# Patient Record
Sex: MALE | Race: BLACK OR AFRICAN AMERICAN | NOT HISPANIC OR LATINO | Employment: FULL TIME | URBAN - METROPOLITAN AREA
[De-identification: names, ages, dates, MRNs, and addresses within clinical notes are randomized per-mention and may not be internally consistent; named-entity substitution may affect disease eponyms.]

---

## 2023-03-13 ENCOUNTER — HOSPITAL ENCOUNTER (EMERGENCY)
Facility: HOSPITAL | Age: 23
Discharge: HOME | End: 2023-03-13
Attending: INTERNAL MEDICINE
Payer: MEDICAID

## 2023-03-13 VITALS
SYSTOLIC BLOOD PRESSURE: 140 MMHG | HEIGHT: 72 IN | RESPIRATION RATE: 16 BRPM | DIASTOLIC BLOOD PRESSURE: 84 MMHG | HEART RATE: 54 BPM | BODY MASS INDEX: 22.69 KG/M2 | TEMPERATURE: 98 F | WEIGHT: 167.56 LBS | OXYGEN SATURATION: 100 %

## 2023-03-13 DIAGNOSIS — K02.9 COMPLEX DENTAL CAVITY: Primary | ICD-10-CM

## 2023-03-13 PROCEDURE — 99284 EMERGENCY DEPT VISIT MOD MDM: CPT

## 2023-03-13 PROCEDURE — 63600175 PHARM REV CODE 636 W HCPCS: Performed by: INTERNAL MEDICINE

## 2023-03-13 PROCEDURE — 96372 THER/PROPH/DIAG INJ SC/IM: CPT | Performed by: INTERNAL MEDICINE

## 2023-03-13 RX ORDER — KETOROLAC TROMETHAMINE 30 MG/ML
30 INJECTION, SOLUTION INTRAMUSCULAR; INTRAVENOUS
Status: COMPLETED | OUTPATIENT
Start: 2023-03-13 | End: 2023-03-13

## 2023-03-13 RX ORDER — PENICILLIN V POTASSIUM 500 MG/1
500 TABLET, FILM COATED ORAL EVERY 8 HOURS
Qty: 21 TABLET | Refills: 0 | Status: SHIPPED | OUTPATIENT
Start: 2023-03-13 | End: 2023-03-20

## 2023-03-13 RX ORDER — IBUPROFEN 800 MG/1
800 TABLET ORAL 3 TIMES DAILY PRN
Qty: 20 TABLET | Refills: 0 | Status: SHIPPED | OUTPATIENT
Start: 2023-03-13 | End: 2023-06-06 | Stop reason: SDUPTHER

## 2023-03-13 RX ORDER — CHLORHEXIDINE GLUCONATE ORAL RINSE 1.2 MG/ML
15 SOLUTION DENTAL 2 TIMES DAILY
Qty: 473 ML | Refills: 0 | Status: SHIPPED | OUTPATIENT
Start: 2023-03-13 | End: 2023-03-27

## 2023-03-13 RX ADMIN — KETOROLAC TROMETHAMINE 30 MG: 30 INJECTION, SOLUTION INTRAMUSCULAR at 04:03

## 2023-03-13 NOTE — ED PROVIDER NOTES
Encounter Date: 3/13/2023       History     Chief Complaint   Patient presents with    Dental Pain     Upper right dental pain; old chipped tooth with pain exacerbation.     Presents with toothache today, states chipped teeth on upper right area. No swelling or dysphagia, no trismus or drooling    The history is provided by the patient.   Review of patient's allergies indicates:  No Known Allergies  History reviewed. No pertinent past medical history.  History reviewed. No pertinent surgical history.  History reviewed. No pertinent family history.  Social History     Tobacco Use    Smoking status: Never    Smokeless tobacco: Never     Review of Systems   Constitutional:  Negative for fever.   HENT:  Positive for dental problem. Negative for drooling and sore throat.    Respiratory:  Negative for shortness of breath.    Cardiovascular:  Negative for chest pain.   Gastrointestinal:  Negative for nausea.   Genitourinary:  Negative for dysuria.   Musculoskeletal:  Negative for back pain.   Skin:  Negative for rash.   Neurological:  Negative for weakness.   Hematological:  Does not bruise/bleed easily.   All other systems reviewed and are negative.    Physical Exam     Initial Vitals [03/13/23 0339]   BP Pulse Resp Temp SpO2   (!) 140/84 (!) 54 16 98.1 °F (36.7 °C) 100 %      MAP       --         Physical Exam    Nursing note and vitals reviewed.  Constitutional: He appears well-developed and well-nourished. No distress.   HENT:   Head: Normocephalic and atraumatic.   Nose: Nose normal.   Mouth/Throat: Oropharynx is clear and moist. No oropharyngeal exudate.   Complex cavity on #15   Eyes: Pupils are equal, round, and reactive to light.   Neck: Neck supple.   Normal range of motion.  Cardiovascular:  Regular rhythm, normal heart sounds and intact distal pulses.           Pulmonary/Chest: Breath sounds normal. No respiratory distress.   Musculoskeletal:         General: No edema. Normal range of motion.      Cervical  back: Normal range of motion and neck supple.     Neurological: He is alert and oriented to person, place, and time. He has normal strength. GCS score is 15. GCS eye subscore is 4. GCS verbal subscore is 5. GCS motor subscore is 6.   Skin: Skin is warm and dry. No rash noted.   Psychiatric: His behavior is normal.       ED Course   Procedures  Labs Reviewed - No data to display       Imaging Results    None          Medications   ketorolac injection 30 mg (has no administration in time range)                              Clinical Impression:   Final diagnoses:  [K02.9] Complex dental cavity (Primary)        ED Disposition Condition    Discharge Stable          ED Prescriptions       Medication Sig Dispense Start Date End Date Auth. Provider    penicillin v potassium (VEETID) 500 MG tablet Take 1 tablet (500 mg total) by mouth every 8 (eight) hours. for 7 days 21 tablet 3/13/2023 3/20/2023 Julio Cooley MD    chlorhexidine (PERIDEX) 0.12 % solution Use as directed 15 mLs in the mouth or throat 2 (two) times daily. for 14 days 473 mL 3/13/2023 3/27/2023 Julio Cooley MD    ibuprofen (ADVIL,MOTRIN) 800 MG tablet Take 1 tablet (800 mg total) by mouth 3 (three) times daily as needed for Pain. 20 tablet 3/13/2023 -- Julio Cooley MD          Follow-up Information       Follow up With Specialties Details Why Contact Info    [facility] Emergency Medicine  If symptoms worsen [address] [facility] [address] [phone]    Dentist  Schedule an appointment as soon as possible for a visit in 3 days  List Provided             Julio Cooley MD  03/13/23 8264

## 2023-03-19 ENCOUNTER — HOSPITAL ENCOUNTER (EMERGENCY)
Facility: HOSPITAL | Age: 23
Discharge: HOME | End: 2023-03-19
Attending: STUDENT IN AN ORGANIZED HEALTH CARE EDUCATION/TRAINING PROGRAM
Payer: MEDICAID

## 2023-03-19 VITALS
OXYGEN SATURATION: 97 % | TEMPERATURE: 98 F | DIASTOLIC BLOOD PRESSURE: 86 MMHG | WEIGHT: 165.44 LBS | HEART RATE: 71 BPM | SYSTOLIC BLOOD PRESSURE: 134 MMHG | BODY MASS INDEX: 22.41 KG/M2 | RESPIRATION RATE: 19 BRPM | HEIGHT: 72 IN

## 2023-03-19 DIAGNOSIS — J32.0 MAXILLARY SINUSITIS, UNSPECIFIED CHRONICITY: ICD-10-CM

## 2023-03-19 DIAGNOSIS — S00.01XA ABRASION OF SCALP, INITIAL ENCOUNTER: ICD-10-CM

## 2023-03-19 DIAGNOSIS — S09.90XA CLOSED HEAD INJURY, INITIAL ENCOUNTER: Primary | ICD-10-CM

## 2023-03-19 DIAGNOSIS — S06.0XAA CONCUSSION WITH UNKNOWN LOSS OF CONSCIOUSNESS STATUS, INITIAL ENCOUNTER: ICD-10-CM

## 2023-03-19 PROCEDURE — 99284 EMERGENCY DEPT VISIT MOD MDM: CPT | Mod: 25

## 2023-03-19 PROCEDURE — 25000003 PHARM REV CODE 250: Performed by: STUDENT IN AN ORGANIZED HEALTH CARE EDUCATION/TRAINING PROGRAM

## 2023-03-19 RX ORDER — ACETAMINOPHEN 500 MG
1000 TABLET ORAL
Status: COMPLETED | OUTPATIENT
Start: 2023-03-19 | End: 2023-03-19

## 2023-03-19 RX ORDER — BUTALBITAL, ACETAMINOPHEN AND CAFFEINE 50; 325; 40 MG/1; MG/1; MG/1
1 TABLET ORAL EVERY 6 HOURS PRN
Qty: 14 TABLET | Refills: 0 | Status: SHIPPED | OUTPATIENT
Start: 2023-03-19 | End: 2023-06-21 | Stop reason: ALTCHOICE

## 2023-03-19 RX ORDER — AZELASTINE 1 MG/ML
2 SPRAY, METERED NASAL 2 TIMES DAILY
Qty: 30 ML | Refills: 0 | Status: SHIPPED | OUTPATIENT
Start: 2023-03-19 | End: 2023-06-06 | Stop reason: SDUPTHER

## 2023-03-19 RX ORDER — KETOROLAC TROMETHAMINE 10 MG/1
10 TABLET, FILM COATED ORAL
Status: COMPLETED | OUTPATIENT
Start: 2023-03-19 | End: 2023-03-19

## 2023-03-19 RX ADMIN — KETOROLAC TROMETHAMINE 10 MG: 10 TABLET, FILM COATED ORAL at 10:03

## 2023-03-19 RX ADMIN — ACETAMINOPHEN 1000 MG: 500 TABLET ORAL at 10:03

## 2023-03-19 NOTE — Clinical Note
"Tommy Reis" Juarez was seen and treated in our emergency department on 3/19/2023.  He may return to work on 03/22/2023.       If you have any questions or concerns, please don't hesitate to call.      Andrew Douglas MD"
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
## 2023-06-06 ENCOUNTER — OFFICE VISIT (OUTPATIENT)
Dept: INTERNAL MEDICINE | Facility: CLINIC | Age: 23
End: 2023-06-06
Payer: MEDICAID

## 2023-06-06 VITALS
TEMPERATURE: 98 F | BODY MASS INDEX: 23.52 KG/M2 | DIASTOLIC BLOOD PRESSURE: 69 MMHG | RESPIRATION RATE: 20 BRPM | WEIGHT: 173.63 LBS | HEIGHT: 72 IN | HEART RATE: 65 BPM | SYSTOLIC BLOOD PRESSURE: 115 MMHG

## 2023-06-06 DIAGNOSIS — J32.0 RIGHT MAXILLARY SINUSITIS: ICD-10-CM

## 2023-06-06 DIAGNOSIS — J35.8 TONSILLOLITH: Chronic | ICD-10-CM

## 2023-06-06 DIAGNOSIS — Z13.29 SCREENING FOR THYROID DISORDER: ICD-10-CM

## 2023-06-06 DIAGNOSIS — R04.0 EPISTAXIS: ICD-10-CM

## 2023-06-06 DIAGNOSIS — Z76.89 ENCOUNTER TO ESTABLISH CARE: ICD-10-CM

## 2023-06-06 DIAGNOSIS — Z13.220 SCREENING CHOLESTEROL LEVEL: ICD-10-CM

## 2023-06-06 DIAGNOSIS — Z00.00 WELLNESS EXAMINATION: ICD-10-CM

## 2023-06-06 DIAGNOSIS — Z13.1 SCREENING FOR DIABETES MELLITUS: Primary | ICD-10-CM

## 2023-06-06 PROCEDURE — 1159F MED LIST DOCD IN RCRD: CPT | Mod: CPTII,,, | Performed by: NURSE PRACTITIONER

## 2023-06-06 PROCEDURE — 3078F DIAST BP <80 MM HG: CPT | Mod: CPTII,,, | Performed by: NURSE PRACTITIONER

## 2023-06-06 PROCEDURE — 3074F SYST BP LT 130 MM HG: CPT | Mod: CPTII,,, | Performed by: NURSE PRACTITIONER

## 2023-06-06 PROCEDURE — 1160F PR REVIEW ALL MEDS BY PRESCRIBER/CLIN PHARMACIST DOCUMENTED: ICD-10-PCS | Mod: CPTII,,, | Performed by: NURSE PRACTITIONER

## 2023-06-06 PROCEDURE — 1160F RVW MEDS BY RX/DR IN RCRD: CPT | Mod: CPTII,,, | Performed by: NURSE PRACTITIONER

## 2023-06-06 PROCEDURE — 3008F PR BODY MASS INDEX (BMI) DOCUMENTED: ICD-10-PCS | Mod: CPTII,,, | Performed by: NURSE PRACTITIONER

## 2023-06-06 PROCEDURE — 99204 OFFICE O/P NEW MOD 45 MIN: CPT | Mod: S$PBB,,, | Performed by: NURSE PRACTITIONER

## 2023-06-06 PROCEDURE — 3078F PR MOST RECENT DIASTOLIC BLOOD PRESSURE < 80 MM HG: ICD-10-PCS | Mod: CPTII,,, | Performed by: NURSE PRACTITIONER

## 2023-06-06 PROCEDURE — 3074F PR MOST RECENT SYSTOLIC BLOOD PRESSURE < 130 MM HG: ICD-10-PCS | Mod: CPTII,,, | Performed by: NURSE PRACTITIONER

## 2023-06-06 PROCEDURE — 3008F BODY MASS INDEX DOCD: CPT | Mod: CPTII,,, | Performed by: NURSE PRACTITIONER

## 2023-06-06 PROCEDURE — 1159F PR MEDICATION LIST DOCUMENTED IN MEDICAL RECORD: ICD-10-PCS | Mod: CPTII,,, | Performed by: NURSE PRACTITIONER

## 2023-06-06 PROCEDURE — 99204 PR OFFICE/OUTPT VISIT, NEW, LEVL IV, 45-59 MIN: ICD-10-PCS | Mod: S$PBB,,, | Performed by: NURSE PRACTITIONER

## 2023-06-06 PROCEDURE — 99214 OFFICE O/P EST MOD 30 MIN: CPT | Mod: PBBFAC | Performed by: NURSE PRACTITIONER

## 2023-06-06 RX ORDER — AMOXICILLIN 500 MG/1
500 CAPSULE ORAL EVERY 12 HOURS
Qty: 14 CAPSULE | Refills: 0 | Status: SHIPPED | OUTPATIENT
Start: 2023-06-06 | End: 2023-06-21

## 2023-06-06 RX ORDER — AZELASTINE 1 MG/ML
2 SPRAY, METERED NASAL 2 TIMES DAILY
Qty: 30 ML | Refills: 1 | Status: SHIPPED | OUTPATIENT
Start: 2023-06-06 | End: 2023-06-21 | Stop reason: SDUPTHER

## 2023-06-06 RX ORDER — IBUPROFEN 800 MG/1
800 TABLET ORAL EVERY 8 HOURS PRN
Qty: 20 TABLET | Refills: 0 | Status: SHIPPED | OUTPATIENT
Start: 2023-06-06 | End: 2023-06-21 | Stop reason: SDUPTHER

## 2023-06-06 NOTE — PROGRESS NOTES
Benita Montejo, MARICHUY   [facility]  [address] [facility] [address]      PATIENT NAME: Tommy Pizarro  : 2000  DATE: 22  MRN: [id number]      Reason for Visit / Chief Complaint: Establish Care (Fasting, c/o nose bleeds every 1-2 days, refused vaccine)       History of Present Illness / Problem Focused Workflow     Tommy Pizarro is a 22 y.o. Black or  male presents to the clinic to establish PCP in [facility]. PMH closed head injury w/concussion (3/2023), and tonsillolith.    Today, pt presents to establish care. Was seen in ED on 3/19/23 after hitting head on base without helmet while playing softball with questionable loss of consciousness, nausea and some confusion. Head CT was unremarkable and pt was dx'd with concussion. Was prescribed fioricet which he did not . Continues to endorse intermittent headaches. Pt does not take any meds daily and denies any other medical hx. Pt reports nosebleeds off and on x 6 months which occur daily to every two days. Reports will last for about 2 mins or so and occur while working or at home. 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 Reports wears respirator while working. Was seen in ED at [facility] in May 2023 cough and was told ribs were inflamed. Was given astelin which he did not . Release of records signed. Declines vaccines today. Denies chest pain, shortness of breath, cough, fever, dizziness, weakness, abdominal pain, nausea, vomiting, diarrhea, constipation, dysuria, depression, anxiety, SI/HI.    Review of Systems     Review of Systems     See HPI for details    Constitutional: Denies Change in appetite. Denies Chills. Denies Fever. Denies Night sweats.  Eye: Denies Blurred vision. Denies Discharge. Denies Eye pain.  ENT: Denies Decreased hearing. Denies Sore throat. Denies Swollen glands. Admits Nose  bleeds.  Respiratory: Denies Cough. Denies Shortness of breath. Denies Shortness of breath with exertion. Denies Wheezing.  Cardiovascular: Denies Chest pain at rest. Denies Chest pain with exertion. Denies Irregular heartbeat. Denies Palpitations. Denies Edema.  Gastrointestinal: Denies Abdominal pain. Denies Diarrhea. Denies Nausea. Denies Vomiting. Denies Hematemesis or Hematochezia.  Genitourinary: Denies Dysuria. Denies Urinary frequency. Denies Urinary urgency. Denies Blood in urine.  Endocrine: Denies Cold intolerance. Denies Excessive thirst. Denies Heat intolerance. Denies Weight loss. Denies Weight gain.  Musculoskeletal: Denies Painful joints. Denies Weakness.  Integumentary: Denies Rash. Denies Itching. Denies Dry skin.  Neurologic: Denies Dizziness. Denies Fainting. Admits Headache.  Psychiatric: Denies Depression. Denies Anxiety. Denies Suicidal/Homicidal ideations.    All Other ROS: Negative except as stated in HPI.     Medical / Surgical / Social / Family History       No past medical history on file.     History reviewed. No pertinent surgical history.    Social History     Socioeconomic History    Marital status: Single   Tobacco Use    Smoking status: Never     Passive exposure: Never    Smokeless tobacco: Never   Substance and Sexual Activity    Alcohol use: Never    Drug use: Never    Sexual activity: Yes     Partners: Female     Birth control/protection: None        Family History   Problem Relation Age of Onset    Hypertension Father     Hypertension Sister     Diabetes Sister     Hypertension Brother     Hypertension Maternal Grandmother     Diabetes Maternal Grandmother         Medications and Allergies     Medications  Current Outpatient Medications   Medication Instructions    amoxicillin (AMOXIL) 500 mg, Oral, Every 12 hours    azelastine (ASTELIN) 274 mcg, Nasal, 2 times daily    butalbital-acetaminophen-caffeine -40 mg (FIORICET, ESGIC) -40 mg per tablet 1 tablet, Oral,  "Every 6 hours PRN    ibuprofen (ADVIL,MOTRIN) 800 mg, Oral, Every 8 hours PRN         Allergies  Review of patient's allergies indicates:  No Known Allergies    Physical Examination     /69 (BP Location: Right arm, Patient Position: Sitting, BP Method: Medium (Automatic))   Pulse 65   Temp 98.2 °F (36.8 °C) (Oral)   Resp 20   Ht 6' 0.01" (1.829 m)   Wt 78.7 kg (173 lb 9.6 oz)   BMI 23.54 kg/m²     Physical Exam  HENT:      Nose:      Right Turbinates: Enlarged and swollen.      Left Turbinates: Enlarged and swollen.      Right Sinus: Maxillary sinus tenderness and frontal sinus tenderness present.      Comments: R>L     Mouth/Throat:      Comments: Right tonsil stones x 3 noted      General: Alert and oriented, No acute distress.  Head: Normocephalic, Atraumatic.  Eye: Pupils are equal, round and reactive to light, Extraocular movements are intact, Sclera non-icteric.  Ears/Nose/Throat: Normal, Mucosa moist,Clear.  Neck/Thyroid: Supple, Non-tender, No carotid bruit, No lymphadenopathy, No JVD, Full range of motion.  Respiratory: Clear to auscultation bilaterally; No wheezes, rales or rhonchi,Non-labored respirations, Symmetrical chest wall expansion.  Cardiovascular: Regular rate and rhythm, S1/S2 normal, No murmurs, rubs or gallops.  Gastrointestinal: Soft, Non-tender, Non-distended, Normal bowel sounds, No palpable organomegaly.  Musculoskeletal: Normal range of motion.  Integumentary: Warm, Dry, Intact, No suspicious lesions or rashes.  Extremities: No clubbing, cyanosis or edema  Neurologic: No focal deficits, Cranial Nerves II-XII are grossly intact, Motor strength normal upper and lower extremities, Sensory exam intact.  Psychiatric: Normal interaction, Coherent speech, Appropriate affect       Results     No results found for: WBC, HGB, HCT, PLT, CHOL, TRIG, LDLDIRECT, ALT, AST, NA, K, CL, CREATININE, BUN, CO2, TSH, PSA, INR, GLUF, HGBA1C, MICROALBUR    Details    Reading Physician Reading Date " Result Priority   Redd Velasquez MD  [phone] 3/19/2023 STAT   Josue Early MD  [phone] 3/20/2023      Narrative & Impression  EXAMINATION:  CT HEAD WITHOUT CONTRAST     CLINICAL HISTORY:  Trauma related head injury, nausea, headache;     TECHNIQUE:  Low dose axial images were obtained through the head.  Coronal and sagittal reformations were also performed. Contrast was not administered.     Automatic exposure control was utilized to reduce the patient's radiation dose.     DLP= 939     COMPARISON:  [facility] 1219     FINDINGS:  Hemorrhage: No acute intracranial hemorrhage is seen.     CSF spaces: The ventricles sulci and basal cisterns are within normal limits.     Brain parenchyma: Unremarkable with preservation of the grey white junction throughout.     Cerebellum: Unremarkable.     Sella and skull base: The sella appears to be within normal limits for age.     Herniation: None.     Intracranial calcifications: Incidental note is made of bilateral choroid plexus calcification. Incidental note is made of some pineal region calcification.     Calvarium: No acute linear or depressed skull fracture is seen.     Maxillofacial Structures:     Paranasal sinuses: There is some mucoperiosteal thickening in the right maxillary sinus. This is new since the prior examination.     Orbits: The orbits appear unremarkable.     Zygomatic arches: The zygomatic arches are intact and unremarkable.     Temporal bones and mastoids: The temporal bones and mastoids appear unremarkable.     TMJ: The mandibular condyles appear normally placed with respect to the mandibular fossa.     Impression:  Impression:     1. No acute intracranial traumatic injury identified. Details and other findings as noted above.        Electronically signed by: Josue Early  Date:                                            03/20/2023  Time:                                           06:13    Assessment and Plan (including Health Maintenance)      Plan:     1. Encounter to establish care  Records requested from [facility].  Labs ordered; is fasting.    2. Right maxillary sinusitis  Rx amoxcil.  Refilled astelin.  Refilled IBU prn.  Avoid irritants and allergens.  Wash hands frequently to prevent common colds.  Drink plenty of fluids to thin mucus/secretions and use humidifier.   Use NeilMed Nasal Saline Rinse twice a day.  Apply warm compresses to relieve sinus pressure/pain.   Use OTC decongestants as needed (Coricidin for HTN pts, avoid sudafed products).       3. Epistaxis  May be d/t sinusitis.  Use astelin BID.  Nasal saline rinse BID and prn.  Will re-evaluate at next visit.  If unimproved will refer to ENT to eval/treat.   - CBC Auto Differential; Future  - Comprehensive Metabolic Panel; Future  - Protime-INR; Future    4. Tonsillolith  Salt water gargle encouraged.  Use cotton swab to loosen/dislodge.  Proper oral hygiene encouraged.        Problem List Items Addressed This Visit          ENT    Tonsillolith (Chronic)    Epistaxis    Relevant Orders    CBC Auto Differential    Comprehensive Metabolic Panel    Protime-INR    Right maxillary sinusitis       Other    Encounter to establish care     Other Visit Diagnoses       Screening for diabetes mellitus    -  Primary    Relevant Orders    Hemoglobin A1C    Screening cholesterol level        Relevant Orders    Lipid Panel    Screening for thyroid disorder        Relevant Orders    TSH    Wellness examination        Relevant Orders    Urinalysis, Reflex to Urine Culture              Health Maintenance Due   Topic Date Due    Hepatitis C Screening  Never done    Lipid Panel  Never done    HIV Screening  Never done    TETANUS VACCINE  08/08/2021    COVID-19 Vaccine (2 - Moderna series) 09/07/2021       Follow up in about 2 weeks (around 6/20/2023) for Wellness with labs prior to appt.        Signature:  MARICHUY Noland  [facility]  [address]  [facility] [id number]

## 2023-06-21 ENCOUNTER — OFFICE VISIT (OUTPATIENT)
Dept: INTERNAL MEDICINE | Facility: CLINIC | Age: 23
End: 2023-06-21
Payer: MEDICAID

## 2023-06-21 VITALS
HEART RATE: 75 BPM | WEIGHT: 171.38 LBS | RESPIRATION RATE: 20 BRPM | HEIGHT: 72 IN | SYSTOLIC BLOOD PRESSURE: 117 MMHG | DIASTOLIC BLOOD PRESSURE: 69 MMHG | TEMPERATURE: 98 F | BODY MASS INDEX: 23.21 KG/M2

## 2023-06-21 DIAGNOSIS — J32.0 RIGHT MAXILLARY SINUSITIS: Primary | ICD-10-CM

## 2023-06-21 DIAGNOSIS — Z13.220 SCREENING CHOLESTEROL LEVEL: ICD-10-CM

## 2023-06-21 DIAGNOSIS — J35.8 TONSILLOLITH: Chronic | ICD-10-CM

## 2023-06-21 DIAGNOSIS — Z13.29 SCREENING FOR THYROID DISORDER: ICD-10-CM

## 2023-06-21 DIAGNOSIS — Z00.00 WELLNESS EXAMINATION: ICD-10-CM

## 2023-06-21 DIAGNOSIS — Z13.1 SCREENING FOR DIABETES MELLITUS: ICD-10-CM

## 2023-06-21 DIAGNOSIS — Z11.3 ROUTINE SCREENING FOR STI (SEXUALLY TRANSMITTED INFECTION): ICD-10-CM

## 2023-06-21 PROCEDURE — 99214 OFFICE O/P EST MOD 30 MIN: CPT | Mod: PBBFAC | Performed by: NURSE PRACTITIONER

## 2023-06-21 PROCEDURE — 1159F MED LIST DOCD IN RCRD: CPT | Mod: CPTII,,, | Performed by: NURSE PRACTITIONER

## 2023-06-21 PROCEDURE — 1160F RVW MEDS BY RX/DR IN RCRD: CPT | Mod: CPTII,,, | Performed by: NURSE PRACTITIONER

## 2023-06-21 PROCEDURE — 3008F BODY MASS INDEX DOCD: CPT | Mod: CPTII,,, | Performed by: NURSE PRACTITIONER

## 2023-06-21 PROCEDURE — 99395 PREV VISIT EST AGE 18-39: CPT | Mod: 25,S$PBB,, | Performed by: NURSE PRACTITIONER

## 2023-06-21 PROCEDURE — 99395 PR PREVENTIVE VISIT,EST,18-39: ICD-10-PCS | Mod: 25,S$PBB,, | Performed by: NURSE PRACTITIONER

## 2023-06-21 PROCEDURE — 3078F PR MOST RECENT DIASTOLIC BLOOD PRESSURE < 80 MM HG: ICD-10-PCS | Mod: CPTII,,, | Performed by: NURSE PRACTITIONER

## 2023-06-21 PROCEDURE — 3074F PR MOST RECENT SYSTOLIC BLOOD PRESSURE < 130 MM HG: ICD-10-PCS | Mod: CPTII,,, | Performed by: NURSE PRACTITIONER

## 2023-06-21 PROCEDURE — 1160F PR REVIEW ALL MEDS BY PRESCRIBER/CLIN PHARMACIST DOCUMENTED: ICD-10-PCS | Mod: CPTII,,, | Performed by: NURSE PRACTITIONER

## 2023-06-21 PROCEDURE — 3008F PR BODY MASS INDEX (BMI) DOCUMENTED: ICD-10-PCS | Mod: CPTII,,, | Performed by: NURSE PRACTITIONER

## 2023-06-21 PROCEDURE — 1159F PR MEDICATION LIST DOCUMENTED IN MEDICAL RECORD: ICD-10-PCS | Mod: CPTII,,, | Performed by: NURSE PRACTITIONER

## 2023-06-21 PROCEDURE — 3074F SYST BP LT 130 MM HG: CPT | Mod: CPTII,,, | Performed by: NURSE PRACTITIONER

## 2023-06-21 PROCEDURE — 3078F DIAST BP <80 MM HG: CPT | Mod: CPTII,,, | Performed by: NURSE PRACTITIONER

## 2023-06-21 RX ORDER — IBUPROFEN 800 MG/1
800 TABLET ORAL EVERY 8 HOURS PRN
Qty: 20 TABLET | Refills: 1 | Status: SHIPPED | OUTPATIENT
Start: 2023-06-21

## 2023-06-21 RX ORDER — AZELASTINE 1 MG/ML
2 SPRAY, METERED NASAL 2 TIMES DAILY
Qty: 30 ML | Refills: 3 | Status: SHIPPED | OUTPATIENT
Start: 2023-06-21

## 2023-06-21 NOTE — PROGRESS NOTES
Benita Montejo, MARICHUY   [facility]  [address] [facility] [address]      PATIENT NAME: Tommy Pizarro  : 2000  DATE: 23  MRN: [id number]      Reason for Visit / Chief Complaint: wellness (Lab results, did not start ABT. Refused TDAP vaccine)       History of Present Illness / Problem Focused Workflow     Tommy Pizarro is a 22 y.o. Black or  male presents to the clinic for annual wellness exam. PMH closed head injury w/concussion (3/2023), and chronic tonsillolith.      Today, labs reviewed with pt. Reports has had tonsil stones for as long as he can remember. Can barely tolerate the smell and bad breath that occurs. Reports nosebleeds and headaches have resolved/improved with astelin but has not started amoxil. Declines tetanus vaccine today. Denies chest pain, shortness of breath, cough, fever, headache, dizziness, weakness, abdominal pain, nausea, vomiting, diarrhea, constipation, dysuria, depression, anxiety, SI/HI.      Review of Systems     Review of Systems     See HPI for details    Constitutional: Denies Change in appetite. Denies Chills. Denies Fever. Denies Night sweats.  Eye: Denies Blurred vision. Denies Discharge. Denies Eye pain.  ENT: Denies Decreased hearing. Denies Sore throat. Denies Swollen glands.  Respiratory: Denies Cough. Denies Shortness of breath. Denies Shortness of breath with exertion. Denies Wheezing.  Cardiovascular: Denies Chest pain at rest. Denies Chest pain with exertion. Denies Irregular heartbeat. Denies Palpitations. Denies Edema.  Gastrointestinal: Denies Abdominal pain. Denies Diarrhea. Denies Nausea. Denies Vomiting. Denies Hematemesis or Hematochezia.  Genitourinary: Denies Dysuria. Denies Urinary frequency. Denies Urinary urgency. Denies Blood in urine.  Endocrine: Denies Cold intolerance. Denies Excessive thirst. Denies Heat intolerance. Denies Weight loss. Denies Weight  "gain.  Musculoskeletal: Denies Painful joints. Denies Weakness.  Integumentary: Denies Rash. Denies Itching. Denies Dry skin.  Neurologic: Denies Dizziness. Denies Fainting. Denies Headache.  Psychiatric: Denies Depression. Denies Anxiety. Denies Suicidal/Homicidal ideations.    All Other ROS: Negative except as stated in HPI.     Medical / Surgical / Social / Family History       No past medical history on file.     History reviewed. No pertinent surgical history.    Social History     Socioeconomic History    Marital status: Single   Tobacco Use    Smoking status: Never     Passive exposure: Never    Smokeless tobacco: Never   Substance and Sexual Activity    Alcohol use: Never    Drug use: Never    Sexual activity: Yes     Partners: Female     Birth control/protection: None     Social Determinants of Health     Transportation Needs: No Transportation Needs    Lack of Transportation (Medical): No    Lack of Transportation (Non-Medical): No   Housing Stability: Low Risk     Unable to Pay for Housing in the Last Year: No    Number of Places Lived in the Last Year: 2    Unstable Housing in the Last Year: No        Family History   Problem Relation Age of Onset    Hypertension Father     Hypertension Sister     Diabetes Sister     Hypertension Brother     Hypertension Maternal Grandmother     Diabetes Maternal Grandmother         Medications and Allergies     Medications  Current Outpatient Medications   Medication Instructions    azelastine (ASTELIN) 274 mcg, Nasal, 2 times daily    ibuprofen (ADVIL,MOTRIN) 800 mg, Oral, Every 8 hours PRN         Allergies  Review of patient's allergies indicates:  No Known Allergies    Physical Examination     /69 (BP Location: Right arm, Patient Position: Sitting, BP Method: Medium (Automatic))   Pulse 75   Temp 98.2 °F (36.8 °C) (Oral)   Resp 20   Ht 6' 0.01" (1.829 m)   Wt 77.7 kg (171 lb 6.4 oz)   BMI 23.24 kg/m²     Physical Exam  HENT:      Mouth/Throat:      " Comments: Right tonsillar enlargement; right tonsil stones x 3       General: Alert and oriented, No acute distress.  Head: Normocephalic, Atraumatic.  Eye: Pupils are equal, round and reactive to light, Extraocular movements are intact, Sclera non-icteric.  Ears/Nose/Throat: Normal, Mucosa moist,Clear.  Neck/Thyroid: Supple, Non-tender, No carotid bruit, No lymphadenopathy, No JVD, Full range of motion.  Respiratory: Clear to auscultation bilaterally; No wheezes, rales or rhonchi,Non-labored respirations, Symmetrical chest wall expansion.  Cardiovascular: Regular rate and rhythm, S1/S2 normal, No murmurs, rubs or gallops.  Gastrointestinal: Soft, Non-tender, Non-distended, Normal bowel sounds, No palpable organomegaly.  Musculoskeletal: Normal range of motion.  Integumentary: Warm, Dry, Intact, No suspicious lesions or rashes.  Extremities: No clubbing, cyanosis or edema  Neurologic: No focal deficits, Cranial Nerves II-XII are grossly intact, Motor strength normal upper and lower extremities, Sensory exam intact.  Psychiatric: Normal interaction, Coherent speech, Appropriate affect       Results     Lab Results   Component Value Date    WBC 5.37 06/06/2023    HGB 13.3 (L) 06/06/2023    HCT 41.8 (L) 06/06/2023     06/06/2023    CHOL 155 06/06/2023    TRIG 73 06/06/2023    ALT 34 06/06/2023    AST 26 06/06/2023     06/06/2023    K 4.2 06/06/2023    CREATININE 0.86 06/06/2023    BUN 9.2 06/06/2023    CO2 27 06/06/2023    TSH 0.620 06/06/2023    INR 1.12 06/06/2023    HGBA1C 5.3 06/06/2023         Assessment and Plan (including Health Maintenance)     Plan:     1. Wellness examination    Eye Exam - Denies any vision problems.  Dental Exam - Several years. Will contact dentist for visit.   Vaccinations: Flu - / Covid - / Tetanus - declines all vaccines  Labwork -       2. Tonsillolith  Continue salt water gargles.  Encouraged proper oral hygiene.  Use cotton swab to loosen/dislodge.    3. Right maxillary  sinusitis  Improved with astelin.  No need to take amoxcil.  Avoid/limit triggers such as pollen, dust, molds, and pet dander.   Avoid smoke, strong chemicals, cleaning products, perfumes/colognes.      Problem List Items Addressed This Visit          ENT    Tonsillolith (Chronic)    Relevant Orders    Ambulatory referral/consult to ENT    Right maxillary sinusitis - Primary       Other    Wellness examination    Relevant Orders    CBC Auto Differential    Comprehensive Metabolic Panel    Urinalysis, Reflex to Urine Culture     Other Visit Diagnoses       Screening for diabetes mellitus        Relevant Orders    Hemoglobin A1C    Screening for thyroid disorder        Relevant Orders    TSH    Screening cholesterol level        Relevant Orders    Lipid Panel    Routine screening for STI (sexually transmitted infection)        Relevant Orders    Chlamydia/GC, PCR    Hepatitis Panel, Acute    HIV 1/2 Ag/Ab (4th Gen)    SYPHILIS ANTIBODY (WITH REFLEX RPR)              Health Maintenance Due   Topic Date Due    Hepatitis C Screening  Never done    HIV Screening  Never done    TETANUS VACCINE  08/08/2021    COVID-19 Vaccine (2 - Moderna series) 09/07/2021       Follow up in about 1 year (around 6/21/2024) for Wellness with labs one week prior to appt. .        Signature:  MARICHUY Noland  [facility]  [address] [facility] [address]

## 2023-09-25 PROBLEM — Z00.00 WELLNESS EXAMINATION: Status: RESOLVED | Noted: 2023-06-06 | Resolved: 2023-09-25

## 2023-11-30 ENCOUNTER — HOSPITAL ENCOUNTER (EMERGENCY)
Facility: HOSPITAL | Age: 23
Discharge: LEFT BEFORE DISCHARGE | End: 2023-12-01
Attending: FAMILY MEDICINE
Payer: MEDICAID

## 2023-11-30 VITALS
BODY MASS INDEX: 22.27 KG/M2 | WEIGHT: 164.44 LBS | SYSTOLIC BLOOD PRESSURE: 118 MMHG | RESPIRATION RATE: 20 BRPM | HEIGHT: 72 IN | DIASTOLIC BLOOD PRESSURE: 71 MMHG | OXYGEN SATURATION: 98 % | TEMPERATURE: 98 F | HEART RATE: 92 BPM

## 2023-11-30 DIAGNOSIS — Z53.21 ELOPED FROM EMERGENCY DEPARTMENT: Primary | ICD-10-CM

## 2023-11-30 PROCEDURE — 99282 EMERGENCY DEPT VISIT SF MDM: CPT

## 2023-12-01 LAB
FLUAV AG UPPER RESP QL IA.RAPID: NOT DETECTED
FLUBV AG UPPER RESP QL IA.RAPID: NOT DETECTED
RSV A 5' UTR RNA NPH QL NAA+PROBE: DETECTED
SARS-COV-2 RNA RESP QL NAA+PROBE: NOT DETECTED
STREP A PCR (OHS): NOT DETECTED

## 2023-12-01 PROCEDURE — 0241U COVID/RSV/FLU A&B PCR: CPT | Performed by: PHYSICIAN ASSISTANT

## 2023-12-01 PROCEDURE — 87651 STREP A DNA AMP PROBE: CPT | Performed by: PHYSICIAN ASSISTANT

## 2023-12-01 NOTE — ED PROVIDER NOTES
Encounter Date: 11/30/2023       History     Chief Complaint   Patient presents with    flu-like symptoms    Sore Throat    Cough    Generalized Body Aches      Patient reports to the emergency room with complaints of cough, congestion, body aches and sore throat for the past 2 days    The history is provided by the patient.   URI  The primary symptoms include sore throat, cough and myalgias. Primary symptoms do not include fever, nausea or rash. The current episode started yesterday.   The sore throat is not accompanied by trouble swallowing, drooling or hoarse voice.   The cough began yesterday. The sputum is clear.   Myalgias began yesterday. The myalgias are not associated with weakness.     Review of patient's allergies indicates:  No Known Allergies  No past medical history on file.  No past surgical history on file.  Family History   Problem Relation Age of Onset    Hypertension Father     Hypertension Sister     Diabetes Sister     Hypertension Brother     Hypertension Maternal Grandmother     Diabetes Maternal Grandmother      Social History     Tobacco Use    Smoking status: Never     Passive exposure: Never    Smokeless tobacco: Never   Substance Use Topics    Alcohol use: Never    Drug use: Never     Review of Systems   Constitutional:  Negative for fever.   HENT:  Positive for sore throat. Negative for drooling, hoarse voice and trouble swallowing.    Respiratory:  Positive for cough. Negative for shortness of breath.    Cardiovascular:  Negative for chest pain.   Gastrointestinal:  Negative for nausea.   Genitourinary:  Negative for dysuria.   Musculoskeletal:  Positive for myalgias. Negative for back pain.   Skin:  Negative for rash.   Neurological:  Negative for weakness.   Hematological:  Does not bruise/bleed easily.   Psychiatric/Behavioral: Negative.         Physical Exam     Initial Vitals [11/30/23 2339]   BP Pulse Resp Temp SpO2   118/71 92 20 98.4 °F (36.9 °C) 98 %      MAP       --          Physical Exam    Vitals reviewed.  Constitutional: He appears well-developed.   HENT:   Head: Normocephalic and atraumatic.   Eyes: Conjunctivae and EOM are normal. Pupils are equal, round, and reactive to light.   Neck:   Normal range of motion.  Cardiovascular:  Normal rate, regular rhythm and normal heart sounds.           Pulmonary/Chest: Breath sounds normal. No respiratory distress. He has no wheezes. He exhibits no tenderness.   Abdominal: Abdomen is soft. Bowel sounds are normal. He exhibits no distension. There is no abdominal tenderness.   Musculoskeletal:         General: Normal range of motion.      Cervical back: Normal range of motion.     Neurological: He is alert and oriented to person, place, and time. He displays normal reflexes. No cranial nerve deficit or sensory deficit. GCS score is 15. GCS eye subscore is 4. GCS verbal subscore is 5. GCS motor subscore is 6.   Skin: Skin is warm. No pallor.   Psychiatric: He has a normal mood and affect. His behavior is normal. Judgment and thought content normal.         ED Course   Procedures  Labs Reviewed   COVID/RSV/FLU A&B PCR - Abnormal; Notable for the following components:       Result Value    Respiratory Syncytial Virus PCR Detected (*)     All other components within normal limits    Narrative:     The Xpert Xpress SARS-CoV-2/FLU/RSV plus is a rapid, multiplexed real-time PCR test intended for the simultaneous qualitative detection and differentiation of SARS-CoV-2, Influenza A, Influenza B, and respiratory syncytial virus (RSV) viral RNA in either nasopharyngeal swab or nasal swab specimens.         STREP GROUP A BY PCR          Imaging Results    None          Medications - No data to display  Medical Decision Making   Differential includes COVID versus strep versus flu versus pneumonia versus other viral URI     During the patient's vision during patient's visit he informed  ER staff that he had a family emergency, after checking in; patient  reports he needs to leave however he is already been swabbed; patient has the patient portal and he will look up results; patient informed he can always return to the emergency room if there are any issues                                      Clinical Impression:  Final diagnoses:  [Z53.21] Elalmazd from emergency department (Primary)          ED Disposition Condition    Carined                 Mo Braun PA  12/01/23 0127

## 2024-03-04 ENCOUNTER — HOSPITAL ENCOUNTER (EMERGENCY)
Facility: HOSPITAL | Age: 24
Discharge: HOME | End: 2024-03-04
Attending: STUDENT IN AN ORGANIZED HEALTH CARE EDUCATION/TRAINING PROGRAM
Payer: MEDICAID

## 2024-03-04 VITALS
HEIGHT: 72 IN | DIASTOLIC BLOOD PRESSURE: 71 MMHG | SYSTOLIC BLOOD PRESSURE: 119 MMHG | WEIGHT: 171.88 LBS | RESPIRATION RATE: 17 BRPM | BODY MASS INDEX: 23.28 KG/M2 | HEART RATE: 59 BPM | OXYGEN SATURATION: 100 % | TEMPERATURE: 98 F

## 2024-03-04 DIAGNOSIS — J06.9 VIRAL URI WITH COUGH: Primary | ICD-10-CM

## 2024-03-04 LAB
FLUAV AG UPPER RESP QL IA.RAPID: NOT DETECTED
FLUBV AG UPPER RESP QL IA.RAPID: NOT DETECTED
SARS-COV-2 RNA RESP QL NAA+PROBE: NOT DETECTED

## 2024-03-04 PROCEDURE — 99282 EMERGENCY DEPT VISIT SF MDM: CPT

## 2024-03-04 PROCEDURE — 0240U COVID/FLU A&B PCR: CPT | Performed by: STUDENT IN AN ORGANIZED HEALTH CARE EDUCATION/TRAINING PROGRAM

## 2024-03-04 RX ORDER — FLUTICASONE PROPIONATE 50 MCG
1 SPRAY, SUSPENSION (ML) NASAL DAILY
Qty: 15 G | Refills: 0 | Status: SHIPPED | OUTPATIENT
Start: 2024-03-04

## 2024-03-04 NOTE — Clinical Note
"Tommy Reis" Juarez was seen and treated in our emergency department on 3/4/2024.  He may return to work on 03/05/2024.       If you have any questions or concerns, please don't hesitate to call.      ALEXA Segovia    "

## 2024-03-07 ENCOUNTER — OFFICE VISIT (OUTPATIENT)
Dept: OTOLARYNGOLOGY | Facility: CLINIC | Age: 24
End: 2024-03-07
Payer: MEDICAID

## 2024-03-07 VITALS
HEIGHT: 72 IN | OXYGEN SATURATION: 99 % | DIASTOLIC BLOOD PRESSURE: 85 MMHG | RESPIRATION RATE: 20 BRPM | HEART RATE: 86 BPM | TEMPERATURE: 97 F | SYSTOLIC BLOOD PRESSURE: 122 MMHG | WEIGHT: 171 LBS | BODY MASS INDEX: 23.16 KG/M2

## 2024-03-07 DIAGNOSIS — R19.6 HALITOSIS: ICD-10-CM

## 2024-03-07 DIAGNOSIS — J35.8 TONSILLOLITH: Chronic | ICD-10-CM

## 2024-03-07 DIAGNOSIS — J35.01 CHRONIC TONSILLITIS: Primary | ICD-10-CM

## 2024-03-07 PROCEDURE — 99215 OFFICE O/P EST HI 40 MIN: CPT | Mod: PBBFAC | Performed by: STUDENT IN AN ORGANIZED HEALTH CARE EDUCATION/TRAINING PROGRAM

## 2024-03-07 RX ORDER — SODIUM CHLORIDE 9 MG/ML
INJECTION, SOLUTION INTRAVENOUS CONTINUOUS
Status: CANCELLED | OUTPATIENT
Start: 2024-03-07

## 2024-03-07 NOTE — LETTER
March 7, 2024      [facility] En[facility]  [address] [facility] [address]  Phone: [phone]       Patient: Tommy Pizarro   YOB: 2000  Date of Visit: 03/07/2024    To Whom It May Concern:    Eliot Pizarro  was at [facility] on 03/07/2024. The patient may return to work/school on 03/11/2024 with no restrictions. If you have any questions or concerns, or if I can be of further assistance, please do not hesitate to contact me.    Sincerely,    Allyson Mcnally MA

## 2024-03-07 NOTE — PROGRESS NOTES
[facility]  Otolaryngology-Head & Neck Surgery    Office Visit    Patient Name: Tommy Pizarro  MRN: [id number]  YOB: 2000  Date of Encounter: 03/07/2024  Physician: Charlie Gudino MD      CC: tonsil stones    HPI: Tommy Pizarro is a 23 y.o. male with no significant past medical history who presents with several years of tonsil stones.  He reports getting these several times per day and they have a very foul-smelling odor to them.  He frequently has to pop them out in cough them up.  He has tried several different types of mouthwashes and gargles in the past without any benefit.  He does not have any recurrent streptococcal tonsillitis infections.  He does say that he snores quite loudly and occasionally has sleep that is not restful, but has never been evaluated for obstructive sleep apnea before.  He has no previous surgeries of his head or neck area.  No allergies.  Does not take any medications currently.  He has never had a history of easy bleeding/bruising.  He is quite interested in surgery.  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    PHYSICAL EXAM:  Vitals:    03/07/24 0958   BP: 122/85   Pulse: 86   Resp: 20   Temp: 96.5 °F (35.8 °C)       General Appearance: well nourished, well-developed, alert, oriented, in no acute distress, no dysphonia  Head/Face: Normocephalic, atraumatic  Eyes: EOMI, normal conjunctiva  Ears: Hears well at normal conversation volume  AD: external normal, ear canal normal, TM intact without effusion or retraction  AS: external normal, ear canal normal, TM intact without effusion or retraction  Nose: External nose normal, septum midline, minimal inferior turbinate hypertrophy, no epistaxis  Oral Cavity & Oropharynx: Lips normal. Tongue without masses or lesions. Bee tongue position I.  Dentition appropriate for age. Oropharynx with 3 to 4+ tonsils, exophytic and cryptic.  No tonsilliths observe today. There  is erythema to the tonsils into the posterior pharyngeal wall with some cobblestoning.  No masses, lesions, or leukoplakia. Floor of mouth and base of tongue are soft.   Neck: Soft, non-tender, no palpable lymph nodes. Thyroid without nodules or goiter.   Respiratory: Nonlabored breathing on room air. No stridor or stertor.   Neuro: CN II - XII intact  Psychiatric: oriented to time, place and person, no depression, anxiety or agitation      PERTINENT DATA:  I have personally reviewed the outside ED notes as well as previous streptococcal test (which have been normal).    ASSESSMENT:  Tommy Pizarro is a 23 y.o. male with persistent tonsilloliths recalcitrant to conservative therapy. He also has snoring and restless sleep but history is not suspicious for sleep apnea and he is a thin male with Bee tongue position I.       PLAN:  -- Discussed options for treatment including continued observation, conservative therapy with mouthwash, in tonsillectomy.  I discussed with him in great detail the risks including pain, dehydration, and bleeding after surgery.  He is eager to proceed with tonsillectomy.  We will arrange for this on April 8, 2024.  He will need a consent on the day of surgery since this is greater than 30 days from now.  -- I did offer him a sleep study prior to scheduling surgery for his symptoms of snoring and restless sleep.  However, he would like to decline this at this time and says that if his sleep is not improved after surgery then we could explore it further then.  Again, based on his history and physical exam I am not suspicious for DAT.    Surgery April 8, RTC 4-6 weeks after surgery    Charlie Gudino MD  [employer] Otolaryngology PGY-4  10:25 AM 03/07/2024

## 2024-03-07 NOTE — PATIENT INSTRUCTIONS
Tonsillectomy & Adenoidectomy Post-Operative Instructions    Activity: No heavy lifting, straining, or bending for 2 weeks. No heavy exercise for two weeks. You may shower after your surgery.    Diet: After surgery, the most important thing is to drink as many fluids as you can to stay hydrated. Water, sports drinks, and juice are all good options. We would like for you to stay on a soft diet for two weeks. This includes yogurt, jello, smoothies, protein shakes, mashed potatoes, mac & cheese, pasta, and rice. Please avoid hard, crunchy foods as these may irritate the back of the throat. Some things to avoid include cookies, crackers, chips, bread, and fried chicken. You may also want to avoid acidic or spicy foods, as these could irritate the back of the throat.     Medications: After surgery, it will be important to stay ahead of your pain. There are a few different type of medications to help you do this. First, we recommend taking Tylenol 650mg and Ibuprofen (Motrin, Advil) 600mg on a scheduled basis. You can take these every 6 hours and can alternate them so that you are getting something for pain every 3 hours. An example schedule might look like this:     7:00am - Ibuprofen  10:00am - Tylenol  1:00pm - Ibuprofen  4:00pm - Tylenol  7:00pm - Ibuprofen  10:00pm - Tylenol    We have prescribed some additional stronger pain medications for you after this surgery in case you need them. Please take these as described. If you were prescribed narcotics (e.g. Norco, oxycodone), then do not take these while driving or operating heavy machinery. Please also note that some medications like Norco have Tylenol already in it, so do not take Tylenol and Norco at the same time. Do not take more than 4,000mg of Tylenol in a single 24 hour period.    Finally, we have prescribed a dose of steroids in case you need it to take on the 3rd or 4th day after surgery. These are usually the most painful days. You may take one dose/tablet  on the 3rd day, if you need it. If you are still in pain, you can take the second dose 24 hours later.    What To Expect: Your pain may last for up to 2 weeks. After the surgery, you will have scabs in the back of your throat that will look white, yellow, or gray. Do not be alarmed, as this is normal and not a sign of infection. It is normal to have neck pain or even ear pain, as the pain from the throat can be felt in both of these areas. You may also have bad breath as the scabs fall off in a couple of days.     What to Watch For: The major thing to watch for is bleeding after the surgery. This is uncommon, but does sometimes happen. If you notice bleeding from your throat that is more than a tablespoon or does not stop, then please call the office or the on-call physician (see the numbers below). Sometimes, this is something we can manage over the phone. Sometimes, this would require you to come into the hospital. If you are unsure, you can always call. If there is uncontrolled bleeding that does not stop, please go to the nearest emergency room.     Follow Up: Please follow up with the ENT Team in approximately 4 weeks. Your appointment will be at [facility] ([address] [place] [address], [place] [address]). The ENT Clinic will call you with an appointment date and time. If you have any questions or concerns in the meantime, you may call the clinic at [phone]. If you have any questions or concerns after hours, call [phone] and you will be connected to an on-call ENT physician.

## 2024-03-07 NOTE — LETTER
March 7, 2024      [facility]  [address] [address]  Phone: [phone]       Patient: Tommy Pizarro   YOB: 2000  Date of Visit: 03/07/2024    To Whom It May Concern:    Eliot Pizarro  was at [facility] on 03/07/2024. The patient may return to work/school on 03/08/2024 with no restrictions. If you have any questions or concerns, or if I can be of further assistance, please do not hesitate to contact me.    Sincerely,    Allyson Mcnally MA

## 2024-03-28 ENCOUNTER — ANESTHESIA EVENT (OUTPATIENT)
Dept: SURGERY | Facility: HOSPITAL | Age: 24
End: 2024-03-28
Payer: MEDICAID

## 2024-03-28 NOTE — ANESTHESIA PREPROCEDURE EVALUATION
"Tommy Pizarro is a 23 y.o. male PRESENTING FOR TONSILLECTOMY (Bilateral: Throat) with a history of   -TONSILLOLITH      Vitals:    04/08/24 0541 04/08/24 0555 04/08/24 0638   BP: 126/74     Pulse: 82     Resp:   20   Temp: 37.1 °C (98.8 °F)     TempSrc: Oral     SpO2: 100%     Weight:  77.8 kg (171 lb 9.6 oz)    Height:  6' 0.01" (1.829 m)        -EPISTAXIS  -SINUSITIS  -closed head injury w/concussion (3/2023) while playing softball  -3/4 viral URI/RSV 12/2023  -snores/fatigued    BETA-BLOCKER: NONE; PO ERAS MEDS Pre op     New Orders for Anesthesia: NONE    Patient Active Problem List   Diagnosis    Epistaxis    Right maxillary sinusitis    Tonsillolith       No past surgical history on file.    Lab Results   Component Value Date    WBC 5.37 06/06/2023    HGB 13.3 (L) 06/06/2023    HCT 41.8 (L) 06/06/2023     06/06/2023       CMP  Sodium Level   Date Value Ref Range Status   06/06/2023 140 136 - 145 mmol/L Final     Potassium Level   Date Value Ref Range Status   06/06/2023 4.2 3.5 - 5.1 mmol/L Final     Carbon Dioxide   Date Value Ref Range Status   06/06/2023 27 22 - 29 mmol/L Final     Blood Urea Nitrogen   Date Value Ref Range Status   06/06/2023 9.2 8.9 - 20.6 mg/dL Final     Creatinine   Date Value Ref Range Status   06/06/2023 0.86 0.73 - 1.18 mg/dL Final     Calcium Level Total   Date Value Ref Range Status   06/06/2023 9.4 8.4 - 10.2 mg/dL Final     Albumin Level   Date Value Ref Range Status   06/06/2023 4.2 3.5 - 5.0 g/dL Final     Bilirubin Total   Date Value Ref Range Status   06/06/2023 1.6 (H) <=1.5 mg/dL Final     Alkaline Phosphatase   Date Value Ref Range Status   06/06/2023 76 40 - 150 unit/L Final     Aspartate Aminotransferase   Date Value Ref Range Status   06/06/2023 26 5 - 34 unit/L Final     Alanine Aminotransferase   Date Value Ref Range Status   06/06/2023 34 0 - 55 unit/L Final     eGFR   Date Value Ref Range Status   06/06/2023 >60 mls/min/1.73/m2 Final       Lab Results "   Component Value Date    PROTIME 14.2 (H) 06/06/2023    INR 1.1 06/06/2023     EKG 5/7/23          Current Outpatient Medications   Medication Instructions    azelastine (ASTELIN) 274 mcg, Nasal, 2 times daily    fluticasone propionate (FLONASE) 50 mcg, Each Nostril, Daily    ibuprofen (ADVIL,MOTRIN) 800 mg, Oral, Every 8 hours PRN       Past anesthesia records: NONE    Pre-op Assessment    I have reviewed the Patient Summary Reports.     I have reviewed the Nursing Notes. I have reviewed the NPO Status.   I have reviewed the Medications.     Review of Systems  Anesthesia Hx:  No problems with previous Anesthesia   History of prior surgery of interest to airway management or planning:          Denies Family Hx of Anesthesia complications.    Denies Personal Hx of Anesthesia complications.                    Hematology/Oncology:  Hematology Normal   Oncology Normal                                   EENT/Dental:  EENT/Dental Normal           Cardiovascular:  Cardiovascular Normal                                            Pulmonary:  Pulmonary Normal                       Renal/:  Renal/ Normal                 Hepatic/GI:  Hepatic/GI Normal                 Musculoskeletal:  Musculoskeletal Normal                Neurological:  Neurology Normal                                      Endocrine:  Endocrine Normal            Dermatological:  Skin Normal    Psych:  Psychiatric Normal                    Physical Exam  General: Well nourished, Cooperative, Alert and Oriented    Airway:  Mallampati: I / I  Mouth Opening: Normal  TM Distance: Normal  Tongue: Normal  Neck ROM: Normal ROM    Dental:  Intact        Anesthesia Plan  Type of Anesthesia, risks & benefits discussed:    Anesthesia Type: Gen ETT  Intra-op Monitoring Plan: Standard ASA Monitors  Post Op Pain Control Plan: IV/PO Opioids PRN  (medical reason for not using multimodal pain management)  Induction:  IV  Informed Consent: Informed consent signed with the  Patient and all parties understand the risks and agree with anesthesia plan.  All questions answered. Patient consented to blood products? No  ASA Score: 1  Day of Surgery Review of History & Physical: H&P Update referred to the surgeon/provider.    Ready For Surgery From Anesthesia Perspective.     .

## 2024-04-05 ENCOUNTER — PATIENT MESSAGE (OUTPATIENT)
Dept: ADMINISTRATIVE | Facility: OTHER | Age: 24
End: 2024-04-05
Payer: MEDICAID

## 2024-04-06 ENCOUNTER — PATIENT MESSAGE (OUTPATIENT)
Dept: ADMINISTRATIVE | Facility: OTHER | Age: 24
End: 2024-04-06
Payer: MEDICAID

## 2024-04-07 ENCOUNTER — PATIENT MESSAGE (OUTPATIENT)
Dept: ADMINISTRATIVE | Facility: OTHER | Age: 24
End: 2024-04-07
Payer: MEDICAID

## 2024-04-08 ENCOUNTER — ANESTHESIA (OUTPATIENT)
Dept: SURGERY | Facility: HOSPITAL | Age: 24
End: 2024-04-08
Payer: MEDICAID

## 2024-04-08 ENCOUNTER — HOSPITAL ENCOUNTER (OUTPATIENT)
Facility: HOSPITAL | Age: 24
Discharge: HOME | End: 2024-04-08
Attending: OTOLARYNGOLOGY | Admitting: OTOLARYNGOLOGY
Payer: MEDICAID

## 2024-04-08 DIAGNOSIS — J35.8 TONSILLOLITH: ICD-10-CM

## 2024-04-08 DIAGNOSIS — J35.01 CHRONIC TONSILLITIS: ICD-10-CM

## 2024-04-08 PROCEDURE — 36000707: Performed by: OTOLARYNGOLOGY

## 2024-04-08 PROCEDURE — 37000008 HC ANESTHESIA 1ST 15 MINUTES: Performed by: OTOLARYNGOLOGY

## 2024-04-08 PROCEDURE — 63600175 PHARM REV CODE 636 W HCPCS: Performed by: NURSE ANESTHETIST, CERTIFIED REGISTERED

## 2024-04-08 PROCEDURE — 36000706: Performed by: OTOLARYNGOLOGY

## 2024-04-08 PROCEDURE — 37000009 HC ANESTHESIA EA ADD 15 MINS: Performed by: OTOLARYNGOLOGY

## 2024-04-08 PROCEDURE — D9220A PRA ANESTHESIA: Mod: ANES,,, | Performed by: SPECIALIST

## 2024-04-08 PROCEDURE — 25000003 PHARM REV CODE 250: Performed by: OTOLARYNGOLOGY

## 2024-04-08 PROCEDURE — 25000003 PHARM REV CODE 250: Performed by: NURSE ANESTHETIST, CERTIFIED REGISTERED

## 2024-04-08 PROCEDURE — D9220A PRA ANESTHESIA: Mod: CRNA,,, | Performed by: NURSE ANESTHETIST, CERTIFIED REGISTERED

## 2024-04-08 PROCEDURE — 88304 TISSUE EXAM BY PATHOLOGIST: CPT | Mod: TC | Performed by: OTOLARYNGOLOGY

## 2024-04-08 PROCEDURE — 71000016 HC POSTOP RECOV ADDL HR: Performed by: OTOLARYNGOLOGY

## 2024-04-08 PROCEDURE — 71000033 HC RECOVERY, INTIAL HOUR: Performed by: OTOLARYNGOLOGY

## 2024-04-08 PROCEDURE — 25000003 PHARM REV CODE 250: Performed by: SPECIALIST

## 2024-04-08 PROCEDURE — 71000015 HC POSTOP RECOV 1ST HR: Performed by: OTOLARYNGOLOGY

## 2024-04-08 PROCEDURE — 63600175 PHARM REV CODE 636 W HCPCS: Performed by: SPECIALIST

## 2024-04-08 RX ORDER — DIPHENHYDRAMINE HYDROCHLORIDE 50 MG/ML
25 INJECTION INTRAMUSCULAR; INTRAVENOUS ONCE AS NEEDED
Status: DISCONTINUED | OUTPATIENT
Start: 2024-04-08 | End: 2024-04-08 | Stop reason: HOSPADM

## 2024-04-08 RX ORDER — FENTANYL CITRATE 50 UG/ML
INJECTION, SOLUTION INTRAMUSCULAR; INTRAVENOUS
Status: DISCONTINUED | OUTPATIENT
Start: 2024-04-08 | End: 2024-04-08

## 2024-04-08 RX ORDER — OXYCODONE AND ACETAMINOPHEN 5; 325 MG/1; MG/1
2 TABLET ORAL ONCE
Status: DISCONTINUED | OUTPATIENT
Start: 2024-04-08 | End: 2024-04-08 | Stop reason: HOSPADM

## 2024-04-08 RX ORDER — SODIUM CHLORIDE 9 MG/ML
INJECTION, SOLUTION INTRAVENOUS CONTINUOUS
Status: DISCONTINUED | OUTPATIENT
Start: 2024-04-08 | End: 2024-04-08 | Stop reason: HOSPADM

## 2024-04-08 RX ORDER — ONDANSETRON HYDROCHLORIDE 2 MG/ML
4 INJECTION, SOLUTION INTRAVENOUS ONCE
Status: DISCONTINUED | OUTPATIENT
Start: 2024-04-08 | End: 2024-04-08 | Stop reason: HOSPADM

## 2024-04-08 RX ORDER — MEPERIDINE HYDROCHLORIDE 25 MG/ML
12.5 INJECTION INTRAMUSCULAR; INTRAVENOUS; SUBCUTANEOUS ONCE
Status: DISCONTINUED | OUTPATIENT
Start: 2024-04-08 | End: 2024-04-08 | Stop reason: HOSPADM

## 2024-04-08 RX ORDER — ONDANSETRON HYDROCHLORIDE 2 MG/ML
INJECTION, SOLUTION INTRAVENOUS
Status: DISCONTINUED | OUTPATIENT
Start: 2024-04-08 | End: 2024-04-08

## 2024-04-08 RX ORDER — PROPOFOL 10 MG/ML
VIAL (ML) INTRAVENOUS
Status: DISCONTINUED | OUTPATIENT
Start: 2024-04-08 | End: 2024-04-08

## 2024-04-08 RX ORDER — KETAMINE HCL IN 0.9 % NACL 50 MG/5 ML
SYRINGE (ML) INTRAVENOUS
Status: DISCONTINUED | OUTPATIENT
Start: 2024-04-08 | End: 2024-04-08

## 2024-04-08 RX ORDER — DEXMEDETOMIDINE HYDROCHLORIDE 100 UG/ML
INJECTION, SOLUTION INTRAVENOUS
Status: DISCONTINUED | OUTPATIENT
Start: 2024-04-08 | End: 2024-04-08

## 2024-04-08 RX ORDER — ACETAMINOPHEN 500 MG
500 TABLET ORAL EVERY 6 HOURS
Qty: 84 TABLET | Refills: 0
Start: 2024-04-08 | End: 2024-04-29

## 2024-04-08 RX ORDER — PROCHLORPERAZINE EDISYLATE 5 MG/ML
5 INJECTION INTRAMUSCULAR; INTRAVENOUS ONCE AS NEEDED
Status: DISCONTINUED | OUTPATIENT
Start: 2024-04-08 | End: 2024-04-08 | Stop reason: HOSPADM

## 2024-04-08 RX ORDER — ACETAMINOPHEN 500 MG
1000 TABLET ORAL
Status: COMPLETED | OUTPATIENT
Start: 2024-04-08 | End: 2024-04-08

## 2024-04-08 RX ORDER — OXYMETAZOLINE HCL 0.05 %
SPRAY, NON-AEROSOL (ML) NASAL
Status: DISCONTINUED | OUTPATIENT
Start: 2024-04-08 | End: 2024-04-08 | Stop reason: HOSPADM

## 2024-04-08 RX ORDER — HYDROMORPHONE HYDROCHLORIDE 1 MG/ML
0.5 INJECTION, SOLUTION INTRAMUSCULAR; INTRAVENOUS; SUBCUTANEOUS EVERY 5 MIN PRN
Status: DISCONTINUED | OUTPATIENT
Start: 2024-04-08 | End: 2024-04-08 | Stop reason: HOSPADM

## 2024-04-08 RX ORDER — LIDOCAINE HYDROCHLORIDE 20 MG/ML
INJECTION INTRAVENOUS
Status: DISCONTINUED | OUTPATIENT
Start: 2024-04-08 | End: 2024-04-08

## 2024-04-08 RX ORDER — MIDAZOLAM HYDROCHLORIDE 2 MG/2ML
2 INJECTION, SOLUTION INTRAMUSCULAR; INTRAVENOUS ONCE
Status: COMPLETED | OUTPATIENT
Start: 2024-04-08 | End: 2024-04-08

## 2024-04-08 RX ORDER — GABAPENTIN 300 MG/1
600 CAPSULE ORAL
Status: COMPLETED | OUTPATIENT
Start: 2024-04-08 | End: 2024-04-08

## 2024-04-08 RX ORDER — OXYCODONE HYDROCHLORIDE 5 MG/1
5 TABLET ORAL EVERY 4 HOURS PRN
Qty: 25 TABLET | Refills: 0 | Status: SHIPPED | OUTPATIENT
Start: 2024-04-08

## 2024-04-08 RX ORDER — SODIUM CHLORIDE, SODIUM LACTATE, POTASSIUM CHLORIDE, CALCIUM CHLORIDE 600; 310; 30; 20 MG/100ML; MG/100ML; MG/100ML; MG/100ML
INJECTION, SOLUTION INTRAVENOUS CONTINUOUS
Status: DISCONTINUED | OUTPATIENT
Start: 2024-04-08 | End: 2024-04-08 | Stop reason: HOSPADM

## 2024-04-08 RX ORDER — ROCURONIUM BROMIDE 10 MG/ML
INJECTION, SOLUTION INTRAVENOUS
Status: DISCONTINUED | OUTPATIENT
Start: 2024-04-08 | End: 2024-04-08

## 2024-04-08 RX ORDER — KETOROLAC TROMETHAMINE 30 MG/ML
INJECTION, SOLUTION INTRAMUSCULAR; INTRAVENOUS
Status: DISCONTINUED | OUTPATIENT
Start: 2024-04-08 | End: 2024-04-08

## 2024-04-08 RX ORDER — IPRATROPIUM BROMIDE AND ALBUTEROL SULFATE 2.5; .5 MG/3ML; MG/3ML
3 SOLUTION RESPIRATORY (INHALATION) ONCE AS NEEDED
Status: DISCONTINUED | OUTPATIENT
Start: 2024-04-08 | End: 2024-04-08 | Stop reason: HOSPADM

## 2024-04-08 RX ORDER — TRAMADOL HYDROCHLORIDE 50 MG/1
50 TABLET ORAL
Status: COMPLETED | OUTPATIENT
Start: 2024-04-08 | End: 2024-04-08

## 2024-04-08 RX ORDER — HYDROMORPHONE HYDROCHLORIDE 1 MG/ML
0.2 INJECTION, SOLUTION INTRAMUSCULAR; INTRAVENOUS; SUBCUTANEOUS EVERY 5 MIN PRN
Status: DISCONTINUED | OUTPATIENT
Start: 2024-04-08 | End: 2024-04-08 | Stop reason: HOSPADM

## 2024-04-08 RX ORDER — DEXAMETHASONE SODIUM PHOSPHATE 4 MG/ML
INJECTION, SOLUTION INTRA-ARTICULAR; INTRALESIONAL; INTRAMUSCULAR; INTRAVENOUS; SOFT TISSUE
Status: DISCONTINUED | OUTPATIENT
Start: 2024-04-08 | End: 2024-04-08

## 2024-04-08 RX ORDER — IBUPROFEN 600 MG/1
600 TABLET ORAL EVERY 8 HOURS PRN
Start: 2024-04-08

## 2024-04-08 RX ADMIN — ONDANSETRON 4 MG: 2 INJECTION INTRAMUSCULAR; INTRAVENOUS at 09:04

## 2024-04-08 RX ADMIN — LIDOCAINE HYDROCHLORIDE 80 MG: 20 INJECTION INTRAVENOUS at 09:04

## 2024-04-08 RX ADMIN — KETOROLAC TROMETHAMINE 30 MG: 30 INJECTION, SOLUTION INTRAMUSCULAR at 09:04

## 2024-04-08 RX ADMIN — Medication 30 MG: at 09:04

## 2024-04-08 RX ADMIN — DEXMEDETOMIDINE 10 MCG: 200 INJECTION, SOLUTION INTRAVENOUS at 09:04

## 2024-04-08 RX ADMIN — ROCURONIUM BROMIDE 50 MG: 10 INJECTION INTRAVENOUS at 09:04

## 2024-04-08 RX ADMIN — DEXAMETHASONE SODIUM PHOSPHATE 12 MG: 4 INJECTION, SOLUTION INTRA-ARTICULAR; INTRALESIONAL; INTRAMUSCULAR; INTRAVENOUS; SOFT TISSUE at 09:04

## 2024-04-08 RX ADMIN — SODIUM CHLORIDE, POTASSIUM CHLORIDE, SODIUM LACTATE AND CALCIUM CHLORIDE: 600; 310; 30; 20 INJECTION, SOLUTION INTRAVENOUS at 09:04

## 2024-04-08 RX ADMIN — SODIUM CHLORIDE, POTASSIUM CHLORIDE, SODIUM LACTATE AND CALCIUM CHLORIDE: 600; 310; 30; 20 INJECTION, SOLUTION INTRAVENOUS at 07:04

## 2024-04-08 RX ADMIN — ACETAMINOPHEN 1000 MG: 500 TABLET ORAL at 06:04

## 2024-04-08 RX ADMIN — GABAPENTIN 600 MG: 300 CAPSULE ORAL at 06:04

## 2024-04-08 RX ADMIN — SUGAMMADEX 200 MG: 100 INJECTION, SOLUTION INTRAVENOUS at 09:04

## 2024-04-08 RX ADMIN — MIDAZOLAM HYDROCHLORIDE 2 MG: 1 INJECTION, SOLUTION INTRAMUSCULAR; INTRAVENOUS at 08:04

## 2024-04-08 RX ADMIN — TRAMADOL HYDROCHLORIDE 50 MG: 50 TABLET, COATED ORAL at 06:04

## 2024-04-08 RX ADMIN — FENTANYL CITRATE 100 MCG: 50 INJECTION INTRAMUSCULAR; INTRAVENOUS at 09:04

## 2024-04-08 RX ADMIN — PROPOFOL 200 MG: 10 INJECTION, EMULSION INTRAVENOUS at 09:04

## 2024-04-08 RX ADMIN — Medication 20 MG: at 09:04

## 2024-04-08 NOTE — H&P
[facility]  Otolaryngology-Head & Neck Surgery     Office Visit     Patient Name: Tommy Pizarro  MRN: [id number]  YOB: 2000        CC: tonsil stones     HPI: Tommy Pizarro is a 23 y.o. male with no significant past medical history who presents with several years of tonsil stones.  He reports getting these several times per day and they have a very foul-smelling odor to them.  He frequently has to pop them out in cough them up.  He has tried several different types of mouthwashes and gargles in the past without any benefit.  He does not have any recurrent streptococcal tonsillitis infections.  He does say that he snores quite loudly and occasionally has sleep that is not restful, but has never been evaluated for obstructive sleep apnea before.  He has no previous surgeries of his head or neck area.  No allergies.  Does not take any medications currently.  He has never had a history of easy bleeding/bruising.  He is quite interested in surgery.  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    4/8/24:  Here today for surgery. No changes.      PHYSICAL EXAM:  Vitals:    04/08/24 0541   BP: 126/74   Pulse: 82   Temp: 98.8 °F (37.1 °C)          General Appearance: well nourished, well-developed, alert, oriented, in no acute distress, no dysphonia  Head/Face: Normocephalic, atraumatic  Eyes: EOMI, normal conjunctiva  Ears: Hears well at normal conversation volume  AD: external normal, ear canal normal, TM intact without effusion or retraction  AS: external normal, ear canal normal, TM intact without effusion or retraction  Nose: External nose normal, septum midline, minimal inferior turbinate hypertrophy, no epistaxis  Oral Cavity & Oropharynx: Lips normal. Tongue without masses or lesions. Bee tongue position I.  Dentition appropriate for age. Oropharynx with 3 to 4+ tonsils, exophytic and cryptic.  No tonsilliths observe today. There is erythema to  the tonsils into the posterior pharyngeal wall with some cobblestoning.  No masses, lesions, or leukoplakia. Floor of mouth and base of tongue are soft.   Neck: Soft, non-tender, no palpable lymph nodes. Thyroid without nodules or goiter.   Respiratory: Nonlabored breathing on room air. No stridor or stertor.   Neuro: CN II - XII intact  Psychiatric: oriented to time, place and person, no depression, anxiety or agitation        PERTINENT DATA:  I have personally reviewed the outside ED notes as well as previous streptococcal test (which have been normal).     ASSESSMENT:  Tommy Pizarro is a 23 y.o. male with persistent tonsilloliths recalcitrant to conservative therapy. He also has snoring and restless sleep but history is not suspicious for sleep apnea and he is a thin male with Bee tongue position I.        PLAN:  -- Discussed options for treatment including continued observation, conservative therapy with mouthwash, in tonsillectomy.  I discussed with him in great detail the risks including pain, dehydration, and bleeding after surgery.  He is eager to proceed with tonsillectomy.      Luis Laguerre MD

## 2024-04-08 NOTE — DISCHARGE INSTRUCTIONS
Tonsillectomy & Adenoidectomy Post-Operative Instructions    Activity: No heavy lifting, straining, or bending for 2 weeks. No heavy exercise for two weeks. You may shower after your surgery.    Diet: After surgery, the most important thing is to drink as many fluids as you can to stay hydrated. Water, sports drinks, and juice are all good options. We would like for you to stay on a soft diet for two weeks. This includes yogurt, jello, smoothies, protein shakes, mashed potatoes, mac & cheese, pasta, and rice. Please avoid hard, crunchy foods as these may irritate the back of the throat. Some things to avoid include cookies, crackers, chips, bread, and fried chicken. You may also want to avoid acidic or spicy foods, as these could irritate the back of the throat.     Medications: After surgery, it will be important to stay ahead of your pain. There are a few different type of medications to help you do this. First, we recommend taking Tylenol 650mg and Ibuprofen (Motrin, Advil) 600mg on a scheduled basis. You can take these every 6 hours and can alternate them so that you are getting something for pain every 3 hours. An example schedule might look like this:     7:00am - Ibuprofen  10:00am - Tylenol  1:00pm - Ibuprofen  4:00pm - Tylenol  7:00pm - Ibuprofen  10:00pm - Tylenol    We have prescribed some additional stronger pain medications for you after this surgery in case you need them. Please take these as described. If you were prescribed narcotics (e.g. Norco, oxycodone), then do not take these while driving or operating heavy machinery. Please also note that some medications like Norco have Tylenol already in it, so do not take Tylenol and Norco at the same time. Do not take more than 4,000mg of Tylenol in a single 24 hour period.    Finally, we have prescribed a dose of steroids in case you need it to take on the 3rd or 4th day after surgery. These are usually the most painful days. You may take one dose/tablet  on the 3rd day, if you need it. If you are still in pain, you can take the second dose 24 hours later.    What To Expect: Your pain may last for up to 2 weeks. After the surgery, you will have scabs in the back of your throat that will look white, yellow, or gray. Do not be alarmed, as this is normal and not a sign of infection. It is normal to have neck pain or even ear pain, as the pain from the throat can be felt in both of these areas. You may also have bad breath as the scabs fall off in a couple of days.     What to Watch For: The major thing to watch for is bleeding after the surgery. This is uncommon, but does sometimes happen. If you notice bleeding from your throat that is more than a tablespoon or does not stop, then please call the office or the on-call physician (see the numbers below). Sometimes, this is something we can manage over the phone. Sometimes, this would require you to come into the hospital. If you are unsure, you can always call. If there is uncontrolled bleeding that does not stop, please go to the nearest emergency room.     Follow Up: Please follow up with the ENT Team in approximately 4 weeks. Your appointment will be at [facility] ([address] [place] [address], [place] [address]). The ENT Clinic will call you with an appointment date and time. If you have any questions or concerns in the meantime, you may call the clinic at [phone]. If you have any questions or concerns after hours, call [phone] and you will be connected to an on-call ENT physician.

## 2024-04-08 NOTE — OP NOTE
[facility] OTOLARYNGOLOGY OPERATIVE REPORT    Patient Name: Tommy Pizarro  Date of Admission: 4/8/2024  YOB: 2000  Date of procedure: 04/08/2024    Attending Surgeon: Dr. Ritesh Cyr    Resident Surgeon(s):   Luis Laguerre MD, [facility]    Pre-operative diagnosis:  1. Tonsilliths       Post-operative diagnosis:   1. Same    Procedure:  1. Tonsillectomy    Anesthesia: General     Blood Loss: Minimal    Implants: None    Drains: None    Specimens: * No specimens in log *    Complications: None    Findings: 4+ Cryptic tonsils    Indication:  Tommy Pizarro is a 23 y.o. male with tonsil stones. All treatment options were discussed, including observation, medical management, and surgical intervention. Ultimately, the joint decision was made to proceed with tonsillectomy and possible adenoidectomy. Informed consent was obtained from the patient. All risks, benefits, and alternatives were reviewed, and all questions were answered.     Procedure in detail:  Patient was met in preoperative area. R/B/A were discussed with the patient. Patient was brought back to the operative suite. GETA was administered by the anesthesia team. Timeout was performed verifying patient's name, procedure, laterality and allergies. 8mg IV decadron was administered preoperatively. Patient was draped in usaul fashion. Estrella-marilee mouth gag was inserted and oral cavity was exposed. Patient was placed in suspension. Palate was examined for submucous cleft and bifid uvula and was noted to not have either.  4+ cryptic tonsils were noted bilaterally. Red rubber catheter was inserted in through the nare and brought out through the mouth and clamped with a tonsillar clamp. Mirror was utulized to examine the adenoid pad and note to have no hypertrophy.  Allis clamp was used to grasp the superior pole of the right tonsil. Monopolar cautery was used to dissect the tonsil from the surrounding stroma and musculature. Right  tonsil was passed off to the back table for pathology. Afrin soaked tonsil was inserted into the tonsillar fossa. Attention was then turned to the left tonsil. Allis clamp was used to grasp the superior pole. Monopolar cautery was used to dissect the tonsil from the surrounding stroma and musculature. Left tonsil was passed off to the back table for pathology. Afrin soaked tonsil ball was placed in the tonsillar fossa. Tonsil balls were removed. Red rubber catheter was removed. Tonsillar fossas were irrigated out and challenged mechanically using metal baby Yankauer suction. Hemostasis was achieved in bilateral fossas using suction bovie cautery. Suspension was then released momentarily and resuspended for one final hemostasis check. Estrella-marilee mouth gag was then released from suspension and removed in atraumatic fashion.    Patient was then undraped, cleaned off, and turned over to anesthesia for emergence. Patient tolerated procedure well, was extubated without issue and transferred to PACU in stable condition.     Dr. Cyr was present for the entirety of the procedure.    Luis Laguerre MD  [facility], [facility]  4/8/2024 9:11 AM

## 2024-04-08 NOTE — ANESTHESIA POSTPROCEDURE EVALUATION
Anesthesia Post Evaluation    Patient: Tommy Pizarro    Procedure(s) Performed: Procedure(s) (LRB):  TONSILLECTOMY (Bilateral)    Final Anesthesia Type: general      Patient location during evaluation: PACU  Patient participation: Yes- Able to Participate  Level of consciousness: awake and responds to stimulation  Post-procedure vital signs: reviewed and stable  Pain management: adequate  Airway patency: patent    PONV status at discharge: No PONV  Anesthetic complications: no      Cardiovascular status: blood pressure returned to baseline  Respiratory status: unassisted  Hydration status: euvolemic  Follow-up not needed.              Vitals Value Taken Time   /77 04/08/24 1038   Temp 36.2 °C (97.1 °F) 04/08/24 1038   Pulse 64 04/08/24 1038   Resp 14 04/08/24 1038   SpO2 96 % 04/08/24 1038         Event Time   Out of Recovery 10:34:00         Pain/Ambar Score: Pain Rating Prior to Med Admin: 0 (4/8/2024  6:38 AM)  Ambar Score: 9 (4/8/2024 10:38 AM)  Modified Ambar Score: 20 (4/8/2024 10:38 AM)

## 2024-04-08 NOTE — DISCHARGE SUMMARY
[facility]  Discharge Note  Short Stay    Procedure(s) (LRB):  TONSILLECTOMY (Bilateral)      OUTCOME: Condition has improved and patient is now ready for discharge.    DISPOSITION: Home or Self Care    FINAL DIAGNOSIS:  <principal problem not specified>    FOLLOWUP: In clinic    DISCHARGE INSTRUCTIONS:    Discharge Procedure Orders   Diet Adult Regular     Weight bearing restrictions (specify):   Order Comments: Nothing > 10 lbs.        TIME SPENT ON DISCHARGE: 20 minutes

## 2024-04-08 NOTE — TRANSFER OF CARE
"Anesthesia Transfer of Care Note    Patient: Tommy Pizarro    Procedure(s) Performed: Procedure(s) (LRB):  TONSILLECTOMY (Bilateral)    Patient location: PACU    Anesthesia Type: general    Transport from OR: Transported from OR on room air with adequate spontaneous ventilation    Post pain: adequate analgesia    Post assessment: no apparent anesthetic complications    Post vital signs: stable    Level of consciousness: sedated    Nausea/Vomiting: no nausea/vomiting    Complications: none    Transfer of care protocol was followed      Last vitals: Visit Vitals  /62 (Patient Position: Lying)   Pulse 66   Temp 36.1 °C (97 °F)   Resp 15   Ht 6' 0.01" (1.829 m)   Wt 77.8 kg (171 lb 9.6 oz)   SpO2 99%   BMI 23.27 kg/m²     "

## 2024-04-08 NOTE — LETTER
April 8, 2024         [address] [facility] [address]  Phone: [phone]  Fax: [phone]       Patient: Tommy Pizarro   YOB: 2000  Date of Visit: 04/08/2024    To Whom It May Concern:    Please excuse Latosha Esposito from school today. She was with Eliot Pizarro  who was at [facility] on 04/08/2024. She will return to school on 4/9/2024. If you have any questions or concerns, or if I can be of further assistance, please do not hesitate to contact me.    Sincerely,    Karen Hernandez RN

## 2024-04-09 DIAGNOSIS — G89.18 ACUTE POST-OPERATIVE PAIN: Primary | ICD-10-CM

## 2024-04-09 DIAGNOSIS — J35.01 CHRONIC TONSILLITIS: ICD-10-CM

## 2024-04-09 LAB
ESTROGEN SERPL-MCNC: NORMAL PG/ML
INSULIN SERPL-ACNC: NORMAL U[IU]/ML
LAB AP CLINICAL INFORMATION: NORMAL
LAB AP GROSS DESCRIPTION: NORMAL
LAB AP REPORT FOOTNOTES: NORMAL
T3RU NFR SERPL: NORMAL %

## 2024-04-09 RX ORDER — PREDNISONE 20 MG/1
20 TABLET ORAL 2 TIMES DAILY
Qty: 4 TABLET | Refills: 0 | Status: SHIPPED | OUTPATIENT
Start: 2024-04-09 | End: 2024-04-11

## 2024-04-09 NOTE — PROGRESS NOTES
4/9/2024:   Patient is 1 day status post tonsillectomy.  Patient had some bleeding earlier this morning and presented to the clinic for a check.  He had been using some cold water gargles in the bleeding has improved.  He still does have some pinkish secretions.  He has been taking ibuprofen and acetaminophen for his pain and does have oxycodone if needed.  He is tolerating liquids.  Patient was also told that if he needed that he could take steroid tomorrow in the following day but he did not receive a prescription for that.      Exam:   General:  Well-developed well-nourished male in no acute distress but he does indicate that his throat is uncomfortable.    Oral cavity and oropharynx: Tongue and floor mouth without lesions.  He does have small clot present on the right tonsillar fossa without active bleeding at this time.  Eschars are present bilaterally.    Impression:   23-year-old male 1 day status post tonsillectomy for chronic tonsillitis and tonsilliths with minor postoperative bleeding.    Plan:   Instructed the patient to take only cold liquids and soft foods such as Jell-O, pudding, or eye screen today.  Continue current pain medications.  Prescription for prednisone 20 mg 1 p.o. b.i.d. to be taken on postoperative day 2 and 3 if needed.    Keep previously scheduled follow-up appointment.    Ritesh Cyr M.D.

## 2024-04-10 VITALS
DIASTOLIC BLOOD PRESSURE: 75 MMHG | TEMPERATURE: 97 F | SYSTOLIC BLOOD PRESSURE: 120 MMHG | OXYGEN SATURATION: 100 % | HEIGHT: 72 IN | HEART RATE: 74 BPM | BODY MASS INDEX: 23.25 KG/M2 | RESPIRATION RATE: 17 BRPM | WEIGHT: 171.63 LBS

## 2024-04-10 RX ORDER — DEXAMETHASONE 4 MG/1
8 TABLET ORAL EVERY 8 HOURS
Qty: 6 TABLET | Refills: 0 | Status: SHIPPED | OUTPATIENT
Start: 2024-04-10 | End: 2024-04-11

## 2024-04-11 ENCOUNTER — DOCUMENTATION ONLY (OUTPATIENT)
Dept: OTOLARYNGOLOGY | Facility: CLINIC | Age: 24
End: 2024-04-11
Payer: MEDICAID

## 2024-04-11 PROBLEM — J35.01 CHRONIC TONSILLITIS: Status: ACTIVE | Noted: 2024-04-11

## 2024-04-11 NOTE — PROGRESS NOTES
4/11/2024:   Patient is 3 day status post tonsillectomy.  Patient with improving secretions. Still with pain. No further bleeding.      Exam:   General:  Well-developed well-nourished male in no acute distress but he does indicate that his throat is uncomfortable.    Oral cavity and oropharynx: Tongue and floor mouth without lesions.  He does have small clot present on the right tonsillar fossa without active bleeding at this time.  Eschars are present bilaterally.     Impression:   23-year-old male 3 day status post tonsillectomy for chronic tonsillitis and tonsilliths with minor postoperative bleeding.     Plan:   Instructed the patient to take only cold liquids and soft foods such as Jell-O, pudding.  Continue current pain medications.   prescribed steroids.   RTC at regularly schedule appt.     Luis Laguerre

## 2024-07-11 ENCOUNTER — TELEPHONE (OUTPATIENT)
Dept: INTERNAL MEDICINE | Facility: CLINIC | Age: 24
End: 2024-07-11

## 2024-07-11 ENCOUNTER — OFFICE VISIT (OUTPATIENT)
Dept: INTERNAL MEDICINE | Facility: CLINIC | Age: 24
End: 2024-07-11

## 2024-07-11 VITALS
SYSTOLIC BLOOD PRESSURE: 122 MMHG | OXYGEN SATURATION: 100 % | DIASTOLIC BLOOD PRESSURE: 74 MMHG | BODY MASS INDEX: 22.46 KG/M2 | RESPIRATION RATE: 16 BRPM | WEIGHT: 165.81 LBS | TEMPERATURE: 98 F | HEIGHT: 72 IN | HEART RATE: 68 BPM

## 2024-07-11 DIAGNOSIS — S92.901A CLOSED FRACTURE OF RIGHT FOOT, INITIAL ENCOUNTER: ICD-10-CM

## 2024-07-11 DIAGNOSIS — Z11.4 SCREENING FOR HIV (HUMAN IMMUNODEFICIENCY VIRUS): ICD-10-CM

## 2024-07-11 DIAGNOSIS — Z11.3 ROUTINE SCREENING FOR STI (SEXUALLY TRANSMITTED INFECTION): ICD-10-CM

## 2024-07-11 DIAGNOSIS — Z13.220 SCREENING FOR LIPID DISORDERS: ICD-10-CM

## 2024-07-11 DIAGNOSIS — Z13.1 SCREENING FOR DIABETES MELLITUS: ICD-10-CM

## 2024-07-11 DIAGNOSIS — Z11.59 SCREENING FOR VIRAL DISEASE: ICD-10-CM

## 2024-07-11 DIAGNOSIS — Z13.29 SCREENING FOR THYROID DISORDER: ICD-10-CM

## 2024-07-11 DIAGNOSIS — Z76.89 ENCOUNTER TO ESTABLISH CARE: Primary | ICD-10-CM

## 2024-07-11 DIAGNOSIS — Z13.0 SCREENING FOR IRON DEFICIENCY ANEMIA: ICD-10-CM

## 2024-07-11 PROCEDURE — 99214 OFFICE O/P EST MOD 30 MIN: CPT | Mod: PBBFAC

## 2024-07-11 RX ORDER — TRAMADOL HYDROCHLORIDE 50 MG/1
50 TABLET ORAL EVERY 8 HOURS PRN
Qty: 21 TABLET | Refills: 0 | Status: SHIPPED | OUTPATIENT
Start: 2024-07-11 | End: 2024-07-18

## 2024-07-11 NOTE — PATIENT INSTRUCTIONS
REMINDER: Please complete labs within 1 week of appointment.   Please complete satisfaction survey when received. Thank you.    Osman Sanders,     If you are due for any health screening(s) below please notify me so we can arrange them to be ordered and scheduled. Most healthy patients at your age complete them, but you are free to accept or refuse.     If you can't do it, I'll definitely understand. If you can, I'd certainly appreciate it!    All of your core healthy metrics are met.

## 2024-07-11 NOTE — PROGRESS NOTES
PATIENT NAME: Tommy Pizarro  : 2000  DATE: 24  MRN: [id number]          Reason for Visit/Chief Complaint   Establish Care and Foot Injury (Right)       History of Present Illness (HPI)     Tommy Pizarro is a 23 y.o. Black male presenting in clinic today to Establish Care and Foot Injury (Right). No significant medical history. Previous PCP: MARICHUY Kaminski - last office visit on 2023.      2024 patient states while working, boxes of Epsom salt fell on his foot. States he went to urgent care in [place] and was told he has a hair line fracture. States he returned to work after the accident and was given a desk rolling chair to move around in. He works at [employer]. He states worker's comp is currently pending. Denies numbness or tingling of foot. Able to wiggle toes without difficulty.    Denies smoking, drinking, or illicit drug use. Denies chest pain, shortness of breath, cough, headache, dizziness, weakness, abdominal pain, nausea, vomiting, diarrhea, constipation, dysuria, depression, anxiety, SI, and HI.         Review of Systems     Review of Systems   Constitutional: Negative.    HENT: Negative.     Eyes: Negative.    Respiratory: Negative.     Cardiovascular: Negative.    Gastrointestinal: Negative.    Endocrine: Negative.    Genitourinary: Negative.    Musculoskeletal:  Positive for gait problem.   Skin: Negative.    Allergic/Immunologic: Negative.    Hematological: Negative.    Psychiatric/Behavioral: Negative.     All other systems reviewed and are negative.      Medical / Social / Family History   History reviewed. No pertinent past medical history.      Past Surgical History:   Procedure Laterality Date    TONSILLECTOMY Bilateral 2024    Procedure: TONSILLECTOMY;  Surgeon: Ritesh Cyr MD;  Location: [facility];  Service: ENT;  Laterality: Bilateral;         Social History  Tommy Pizarro's  reports that he has never  smoked. He has never been exposed to tobacco smoke. He has never used smokeless tobacco. He reports current alcohol use. He reports that he does not use drugs.    Family History  Tommy Pizarro's family history includes Diabetes in his maternal grandmother and sister; Hypertension in his brother, father, maternal grandmother, and sister.    Medications and Allergies     Medications  Current Outpatient Medications   Medication Instructions    traMADoL (ULTRAM) 50 mg, Oral, Every 8 hours PRN       Allergies  Review of patient's allergies indicates:  No Known Allergies    Physical Examination   Visit Vitals  /74 (BP Location: Right arm, Patient Position: Sitting, BP Method: Small (Automatic))   Pulse 68   Temp 98.3 °F (36.8 °C) (Oral)   Resp 16   Ht 6' (1.829 m)   Wt 75.2 kg (165 lb 12.6 oz)   SpO2 100%   BMI 22.48 kg/m²     Physical Exam  Vitals reviewed.   Constitutional:       Appearance: Normal appearance. He is normal weight.   HENT:      Head: Normocephalic.      Nose: Nose normal.      Mouth/Throat:      Mouth: Mucous membranes are moist.      Pharynx: Oropharynx is clear.   Eyes:      Extraocular Movements: Extraocular movements intact.      Conjunctiva/sclera: Conjunctivae normal.      Pupils: Pupils are equal, round, and reactive to light.   Cardiovascular:      Rate and Rhythm: Normal rate and regular rhythm.      Heart sounds: Normal heart sounds.   Pulmonary:      Effort: Pulmonary effort is normal.      Breath sounds: Normal breath sounds.   Abdominal:      General: Abdomen is flat. Bowel sounds are normal.      Palpations: Abdomen is soft.   Musculoskeletal:      Cervical back: Normal range of motion.      Right foot: Decreased range of motion. Normal capillary refill. No tenderness.      Comments: Right foot walking boot   Skin:     General: Skin is warm and dry.      Capillary Refill: Capillary refill takes less than 2 seconds.   Neurological:      General: No focal deficit present.       "Mental Status: He is alert and oriented to person, place, and time.   Psychiatric:         Mood and Affect: Mood normal.           Results     Lab Results   Component Value Date    WBC 5.37 06/06/2023    RBC 5.79 06/06/2023    HGB 13.3 (L) 06/06/2023    HCT 41.8 (L) 06/06/2023    MCV 72.2 (L) 06/06/2023    MCH 23.0 (L) 06/06/2023    MCHC 31.8 (L) 06/06/2023    RDW 15.7 06/06/2023     06/06/2023    MPV 10.4 06/06/2023      Lab Results   Component Value Date     06/06/2023    K 4.2 06/06/2023    CO2 27 06/06/2023    GLUCOSE 93 06/06/2023    BUN 9.2 06/06/2023    CREATININE 0.86 06/06/2023    LABPROT 7.8 06/06/2023    LABPROT 14.2 (H) 06/06/2023    ALBUMIN 4.2 06/06/2023    BILITOT 1.6 (H) 06/06/2023    ALKPHOS 76 06/06/2023    AST 26 06/06/2023    ALT 34 06/06/2023    EGFRNORACEVR >60 06/06/2023     Lab Results   Component Value Date    TSH 0.620 06/06/2023     Lab Results   Component Value Date    CHOL 155 06/06/2023    HDL 35 06/06/2023    .00 06/06/2023    TRIG 73 06/06/2023     Lab Results   Component Value Date    SGUA 1.036 06/06/2023    PROTEINUA 1+ (A) 06/06/2023    BILIRUBINUA Negative 06/06/2023    WBCUA 21-50 (A) 06/06/2023    RBCUA 0-5 06/06/2023    BACTERIA None Seen 06/06/2023    LEUKOCYTESUR 250 (A) 06/06/2023    UROBILINOGEN Normal 06/06/2023     No results found for: "CREATRANDUR", "MICALBCREAT"  No results found for: "ADKCDZGQ32YC", "V12", "FOLATE"  No results found for: "HIV", "HEPAIGM", "HEPBSAG", "HEPCAB"  No results found for: "FITDIAG", "COLOGUARD"  No results found for: "OCCBLDIA"    Assessment and Plan (including Health Maintenance)     Problem List Items Addressed This Visit          Endocrine    BMI 22.0-22.9, adult    Current Assessment & Plan     Body mass index is 22.48 kg/m². (At goal).             Orthopedic    Closed fracture of bone of right foot    Current Assessment & Plan     Urgent referral placed to [facility] Ortho clinic to eval and treat.   Currently using " crutches and wearing a walking boot on right foot.  Staff to request records from urgent care in [place] ([facility]?)  Work excuse provided to patient to be off from work until evaluated by ortho.   Rx tramadol.          Relevant Medications    traMADoL (ULTRAM) 50 mg tablet    Other Relevant Orders    Ambulatory referral/consult to Orthopedics    Vitamin D       Other    Encounter to establish care - Primary    Current Assessment & Plan     Wellness labs ordered - to be discussed at next office visit.  Encouraged patient to utilize patient portal for messaging.  Records from previous PCP in EMR.          Relevant Orders    Urinalysis, Reflex to Urine Culture    Microalbumin/Creatinine Ratio, Urine    Comprehensive Metabolic Panel    CBC Auto Differential     Other Visit Diagnoses       Routine screening for STI (sexually transmitted infection)        Relevant Orders    SYPHILIS ANTIBODY (WITH REFLEX RPR)    Chlamydia/GC, PCR    Screening for thyroid disorder        Relevant Orders    TSH    T4, Free    Screening for lipid disorders        Relevant Orders    Lipid Panel    Screening for HIV (human immunodeficiency virus)        Relevant Orders    HIV 1/2 Ag/Ab (4th Gen)    Screening for viral disease        Relevant Orders    Hepatitis Panel, Acute    Screening for diabetes mellitus        Relevant Orders    Hemoglobin A1C    Screening for iron deficiency anemia        Relevant Orders    Ferritin    Iron and TIBC             Health Maintenance Due   Topic Date Due    Hepatitis C Screening  Never done    HIV Screening  Never done    TETANUS VACCINE  08/08/2021    COVID-19 Vaccine (2 - 2023-24 season) 09/01/2023     All of your core healthy metrics are met.      Health Maintenance Topics with due status: Not Due       Topic Last Completion Date    Influenza Vaccine 11/28/2017       Future Appointments   Date Time Provider Department Center   8/8/2024  7:40 AM Deloris Pierson[facility]         Follow up in about 4 weeks (around 8/8/2024) for F2F, Follow up, Med check, Lab review, Wellness, RTC PRN.          Signature:        MARICHUY Mendez  [facility]  [address] [address]    Date of encounter: 7/11/24

## 2024-07-11 NOTE — ASSESSMENT & PLAN NOTE
Urgent referral placed to [facility] Ortho clinic to eval and treat.   Currently using crutches and wearing a walking boot on right foot.  Staff to request records from urgent care in [place] ([facility]  Work excuse provided to patient to be off from work until evaluated by ortho.   Rx tramadol.

## 2024-07-11 NOTE — ASSESSMENT & PLAN NOTE
Wellness labs ordered - to be discussed at next office visit.  Encouraged patient to utilize patient portal for messaging.  Records from previous PCP in EMR.

## 2024-07-11 NOTE — TELEPHONE ENCOUNTER
Pt requested at check out for me to schedule the F/U appt; pt refused to schedule appt in portal.

## 2024-07-11 NOTE — LETTER
July 11, 2024    Tommy Pizarro  [address] [address]             [facility]  Internal Medicine  [address] [facility] [facility] [address] [facility] [address]  Phone: [phone]   July 11, 2024     Patient: Tommy Pizarro   YOB: 2000   Date of Visit: 7/11/2024       To Whom it May Concern:    Tommy Pizarro was seen in my clinic on 7/11/2024. Mr. Pizarro has been referred to the Orthopedic clinic at [facility] and he should be excused from work until evaluated by the orthopedic physician. Mr. Pizarro is currently in a walking boot on right foot and crutches.     Please excuse him from any work missed.     If you have any questions or concerns, please don't hesitate to call.    Sincerely,          Deloris Pierson, FNP

## 2024-09-06 ENCOUNTER — TELEPHONE (OUTPATIENT)
Dept: INTERNAL MEDICINE | Facility: CLINIC | Age: 24
End: 2024-09-06

## 2024-09-06 NOTE — TELEPHONE ENCOUNTER
Returned call to patient and informed him that his company will have to contact business office for billing. Patient verbalized understanding, noted.

## 2024-09-06 NOTE — TELEPHONE ENCOUNTER
----- Message from Maryellen Cartwright sent at 9/5/2024  2:35 PM CDT -----  Who Called:[employer] - case experts- workers comp    Caller is requesting assistance/information from provider's office.    Symptoms (please be specific):    How long has patient had these symptoms:    List of preferred pharmacies on file (remove unneeded): [unfilled]  If different, enter pharmacy into here including location and phone number:       Patient's Preferred Phone Number on File: [phone]  Best Call Back Number, if different:f# [phone]  Additional Information: req ofc notes are faxed for appt on 07/11/24

## 2025-07-01 ENCOUNTER — HOSPITAL ENCOUNTER (EMERGENCY)
Facility: HOSPITAL | Age: 25
Discharge: HOME | End: 2025-07-01
Attending: INTERNAL MEDICINE
Payer: MEDICAID

## 2025-07-01 VITALS
HEIGHT: 72 IN | BODY MASS INDEX: 27.86 KG/M2 | WEIGHT: 205.69 LBS | HEART RATE: 65 BPM | RESPIRATION RATE: 18 BRPM | TEMPERATURE: 98 F | OXYGEN SATURATION: 100 % | DIASTOLIC BLOOD PRESSURE: 73 MMHG | SYSTOLIC BLOOD PRESSURE: 117 MMHG

## 2025-07-01 DIAGNOSIS — L60.0 INGROWN TOENAIL OF RIGHT FOOT: Primary | ICD-10-CM

## 2025-07-01 PROCEDURE — 99284 EMERGENCY DEPT VISIT MOD MDM: CPT | Mod: 25

## 2025-07-01 PROCEDURE — 63600175 PHARM REV CODE 636 W HCPCS: Mod: JZ,TB | Performed by: PHYSICIAN ASSISTANT

## 2025-07-01 PROCEDURE — 96372 THER/PROPH/DIAG INJ SC/IM: CPT | Performed by: PHYSICIAN ASSISTANT

## 2025-07-01 RX ORDER — KETOROLAC TROMETHAMINE 30 MG/ML
30 INJECTION, SOLUTION INTRAMUSCULAR; INTRAVENOUS
Status: COMPLETED | OUTPATIENT
Start: 2025-07-01 | End: 2025-07-01

## 2025-07-01 RX ORDER — DICLOFENAC SODIUM 75 MG/1
75 TABLET, DELAYED RELEASE ORAL 2 TIMES DAILY PRN
Qty: 30 TABLET | Refills: 0 | Status: SHIPPED | OUTPATIENT
Start: 2025-07-01

## 2025-07-01 RX ORDER — CEPHALEXIN 500 MG/1
500 CAPSULE ORAL EVERY 6 HOURS
Qty: 28 CAPSULE | Refills: 0 | Status: SHIPPED | OUTPATIENT
Start: 2025-07-01 | End: 2025-07-08

## 2025-07-01 RX ADMIN — KETOROLAC TROMETHAMINE 30 MG: 60 INJECTION, SOLUTION INTRAMUSCULAR at 10:07

## 2025-07-02 NOTE — ED PROVIDER NOTES
Encounter Date: 7/1/2025       History     Chief Complaint   Patient presents with    Toe Pain     Ingrown toe nail to R big toe. Swollen and draining pus. Pain 10/10. Requesting for it to be removed.      24-year-old male presents to the emergency department with ingrown toenail to his right great toe.  Mild swelling with drainage.  Patient is requesting for it to be removed.  Patient rates his pain 10/10.  He denies fever.    The history is provided by the patient. No  was used.     Review of patient's allergies indicates:  No Known Allergies  History reviewed. No pertinent past medical history.  Past Surgical History:   Procedure Laterality Date    TONSILLECTOMY Bilateral 4/8/2024    Procedure: TONSILLECTOMY;  Surgeon: Ritesh Cyr MD;  Location: [facility];  Service: ENT;  Laterality: Bilateral;     Family History   Problem Relation Name Age of Onset    Hypertension Father      Hypertension Sister      Diabetes Sister      Hypertension Brother      Hypertension Maternal Grandmother      Diabetes Maternal Grandmother       Social History[1]  Review of Systems   Musculoskeletal:         Right great toe pain       Physical Exam     Initial Vitals [07/01/25 2127]   BP Pulse Resp Temp SpO2   128/75 78 18 97.9 °F (36.6 °C) 98 %      MAP       --         Physical Exam    Nursing note and vitals reviewed.  Constitutional: He appears well-developed and well-nourished.   Cardiovascular:  Normal rate, normal heart sounds and intact distal pulses.           Pulmonary/Chest: Breath sounds normal.   Abdominal: Abdomen is soft. Bowel sounds are normal.   Musculoskeletal:         General: Normal range of motion.      Comments: Ingrown toenail to right great toe, no active drainage, erythema appreciated     Neurological: He is alert. GCS score is 15. GCS eye subscore is 4. GCS verbal subscore is 5. GCS motor subscore is 6.   Skin: Skin is warm. Capillary refill takes less than 2 seconds.         ED Course    Procedures  Labs Reviewed - No data to display       Imaging Results    None          Medications   ketorolac injection 30 mg (30 mg Intramuscular Given 7/1/25 2201)     Medical Decision Making  24-year-old male presents to the emergency department with ingrown toenail to his right great toe.  Mild swelling with drainage.  Patient is requesting for it to be removed.  Patient rates his pain 10/10.  He denies fever.    Toradol given in the ED for pain.  Keflex prescribed.  Referral sent to ingrown toenail clinic.    Risk  Prescription drug management.               ED Course as of 07/01/25 2356   Tue Jul 01, 2025 2239 The patient is resting comfortably and in no acute distress.  Gave strict ED precautions and specific conditions for return to the emergency department and importance of follow up with pcp and family medicine clinic.  Patient voices understanding and agrees to the plan discussed. All patients' questions have been answered at this time. He has remained hemodynamically stable throughout entire stay in ED and is stable for discharge home. [ER]      ED Course User Index  [ER] Molly Genao PA                           Clinical Impression:  Final diagnoses:  [L60.0] Ingrown toenail of right foot (Primary)          ED Disposition Condition    Discharge Stable          ED Prescriptions       Medication Sig Dispense Start Date End Date Auth. Provider    cephALEXin (KEFLEX) 500 MG capsule Take 1 capsule (500 mg total) by mouth every 6 (six) hours. for 7 days 28 capsule 7/1/2025 7/8/2025 Molly Genao PA    diclofenac (VOLTAREN) 75 MG EC tablet Take 1 tablet (75 mg total) by mouth 2 (two) times daily as needed (pain). Do not take this with Advil, Ibuprofen, Motrin, Asprin, or Toradol. 30 tablet 7/1/2025 -- Molly Genao PA          Follow-up Information       Follow up With Specialties Details Why Contact Info Additional Information    [facility] Emergency Medicine  As needed, If  symptoms worsen [address] [address] [phone]     [facility] Family Medicine  call for Ingrown toenail clinic [address] [facility] [address] [phone] Family Medicine Clinic [address]                   [1]   Social History  Tobacco Use    Smoking status: Never     Passive exposure: Never    Smokeless tobacco: Never   Substance Use Topics    Alcohol use: Yes    Drug use: Never        Molly Genao PA  07/01/25 2203

## 2025-07-02 NOTE — DISCHARGE INSTRUCTIONS
Take medication as prescribed.  Soak your foot and salt water several times a day.  Elevate as much as possible.  Return if symptoms worsen.  Referral sent to family Medicine Clinic.  Call to schedule an appointment at the [facility] toenail clinic.

## 2025-08-15 ENCOUNTER — OFFICE VISIT (OUTPATIENT)
Dept: FAMILY MEDICINE | Facility: CLINIC | Age: 25
End: 2025-08-15
Payer: MEDICAID

## 2025-08-15 VITALS
HEIGHT: 72 IN | WEIGHT: 193 LBS | OXYGEN SATURATION: 100 % | DIASTOLIC BLOOD PRESSURE: 61 MMHG | RESPIRATION RATE: 20 BRPM | SYSTOLIC BLOOD PRESSURE: 106 MMHG | TEMPERATURE: 98 F | BODY MASS INDEX: 26.14 KG/M2 | HEART RATE: 53 BPM

## 2025-08-15 DIAGNOSIS — L60.0 INGROWN TOENAIL OF RIGHT FOOT: ICD-10-CM

## 2025-08-15 PROCEDURE — 99214 OFFICE O/P EST MOD 30 MIN: CPT | Mod: PBBFAC

## (undated) DEVICE — Device

## (undated) DEVICE — KIT SURGICAL TURNOVER

## (undated) DEVICE — GLOVE SENSICARE PI GRN 7.5

## (undated) DEVICE — SUCTION COAGULATOR 10FR 6IN

## (undated) DEVICE — ELECTRODE BLADE INSULATED 1 IN

## (undated) DEVICE — CATH ALL PUR URTHL RR 10FR

## (undated) DEVICE — GLOVE SIGNATURE MICRO LTX 7.5

## (undated) DEVICE — KIT ANTIFOG W/SPONG & FLUID

## (undated) DEVICE — MANIFOLD 4 PORT

## (undated) DEVICE — SOL 9P NACL IRR PIC IL